# Patient Record
Sex: MALE | HISPANIC OR LATINO | Employment: OTHER | URBAN - NONMETROPOLITAN AREA
[De-identification: names, ages, dates, MRNs, and addresses within clinical notes are randomized per-mention and may not be internally consistent; named-entity substitution may affect disease eponyms.]

---

## 2018-07-03 ENCOUNTER — TRANSCRIBE ORDERS (OUTPATIENT)
Dept: PHYSICAL THERAPY | Facility: HOSPITAL | Age: 33
End: 2018-07-03

## 2018-07-03 DIAGNOSIS — M25.561 RIGHT KNEE PAIN, UNSPECIFIED CHRONICITY: Primary | ICD-10-CM

## 2018-07-13 ENCOUNTER — HOSPITAL ENCOUNTER (OUTPATIENT)
Dept: PHYSICAL THERAPY | Facility: HOSPITAL | Age: 33
Setting detail: THERAPIES SERIES
Discharge: HOME | End: 2018-07-13

## 2018-07-13 DIAGNOSIS — M25.561 RIGHT KNEE PAIN, UNSPECIFIED CHRONICITY: Primary | ICD-10-CM

## 2018-07-13 PROCEDURE — 97110 THERAPEUTIC EXERCISES: CPT | Performed by: PHYSICAL THERAPIST

## 2018-07-13 PROCEDURE — 97161 PT EVAL LOW COMPLEX 20 MIN: CPT | Performed by: PHYSICAL THERAPIST

## 2018-07-13 NOTE — THERAPY EVALUATION
"    Outpatient Physical Therapy Ortho Initial Evaluation  [facility]  Lauriat Castellanos, PT, DPT, CSCS       Patient Name: Arley Colbert Jr.  : 1985  MRN: [id number]  Today's Date: 2018      Visit Date: 2018    Pt reports 4/10 pain pre treatment, \"numb\"/10 pain post treatment  Reports N/A% of improvement.  Attended  visits.  Insurance available: 12 visits  Next MD appt: 4 months .  Recertification: 2018.     History reviewed. No pertinent past medical history.     Past Surgical History:   Procedure Laterality Date   • CYSTOSCOPY W/ LASER LITHOTRIPSY     • VASECTOMY         Visit Dx:     ICD-10-CM ICD-9-CM   1. Right knee pain, unspecified chronicity M25.561 719.46     Number of days off work: N/A    Patient is     Patient has 2 children ages 11 and 13 years old.    Medications: Salsalate 750mg, Buspirone HCL 10mg, Sertraline 100mg, Melatonin 3mg    Allergies: None          Patient History     Row Name 18 0800             History    Chief Complaint Pain  -AJ      Type of Pain Knee pain  -      Date Current Problem(s) Began --   Years, Chronic  -AJ      Brief Description of Current Complaint Patient reports years of R knee pain, never getting better, but not worsening.  He reports dring basic training he hit his knee on the floor of a board. He reports he was never really treated for it.   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 [facility]  -      What clinical tests have you had for this problem? X-ray  -      Results of Clinical Tests [facility]agt[facility]  -   "    History of Previous Related Injuries None since intital injury.  -AJ         Pain     Pain Location Knee  -AJ      Pain at Present 4  -AJ      Pain at Best 1  -AJ      Pain at Worst 7  -AJ      Pain Frequency Constant/continuous  -AJ      Pain Description Throbbing;Patient unable to describe  -AJ      What Performance Factors Make the Current Problem(s) WORSE? None  -AJ      What Performance Factors Make the Current Problem(s) BETTER? None  -AJ      Is your sleep disturbed? No  -AJ      Is medication used to assist with sleep? No  -AJ      Difficulties at work? squatting  -AJ      Difficulties with ADL's? None  -AJ      Difficulties with recreational activities? None  -AJ        User Key  (r) = Recorded By, (t) = Taken By, (c) = Cosigned By    Initials Name Provider Type    MARISELA Castellanos PT Physical Therapist                PT Ortho     Row Name 07/13/18 0900       Subjective Comments    Subjective Comments Patent wishes to get rid of the physical therapy.  -AJ       Precautions and Contraindications    Precautions/Limitations no known precautions/limitations  -AJ       Subjective Pain    Able to rate subjective pain? yes  -AJ    Pre-Treatment Pain Level 4  -AJ       Posture/Observations    Posture- WNL Posture is WNL   for B LEs  -AJ    Posture/Observations Comments No acute distress, wearing B flip flop shoe wear.  -AJ       Knee Special Tests    Anterior drawer (ACL lesion) Bilateral:;Negative  -AJ    Lachman’s (ACL lesion) Bilateral:;Negative  -AJ    Posterior drawer (PCL lesion) Bilateral:;Negative  -AJ    Posterior sag sign (PCL lesion) Bilateral:;Negative  -AJ    Valgus stress (MCL lesion) Bilateral:;Negative  -AJ    Varus stress (LCL lesion) Bilateral:;Negative  -AJ    Pivot shift test (ACL lesion) Bilateral:;Negative  -AJ    Apley’s distraction test (meniscal lesion vs OA) Right:;Positive;Left:;Negative  -AJ    Apley’s compression test (meniscal lesion vs OA) Bilateral:;Negative  -AJ     Gregorio’s test (meniscal lesion) Bilateral:;Negative  -AJ    Reverse pivot shift test (posterolateral instability) Bilateral:;Negative  -AJ    Patellar grind test (chondromalacia patella) Bilateral:;Negative  -AJ    Patellar ballotment test (joint effusion) Bilateral:;Negative  -AJ    Patellofemoral apprehension sign (instability) Bilateral:;Negative  -AJ    Tay compression test (distal ITB syndrome) Bilateral:;Negative  -AJ    Carlee’s sign (DVT) Bilateral:;Negative  -AJ       Right Lower Ext    Rt Knee Extension/Flexion AROM 0°-140°  -AJ       Left Lower Ext    Lt Knee Extension/Flexion AROM 0°-135°  -AJ       MMT (Manual Muscle Testing)    Additional Documentation --  -AJ       General Assessment (Manual Muscle Testing)    Comment, General Manual Muscle Testing (MMT) Assessment B LE 5/5, no change in pain.  -AJ       Sensation    Sensation WNL? WNL  -AJ    Light Touch No apparent deficits  -AJ    Additional Comments Patient denies any numbness or tingling.  -AJ       Lower Extremity Flexibility    Hamstrings Bilateral:;Mildly limited   HS angles R 32°, L 30°  -AJ       Girth    Girth Measured? --   B circum measurements 38.0cm @ joint line.  -AJ       Transfers    Comment (Transfers) I with all transfers, unweights R LE some wqith sit to/from stand  -AJ       Gait/Stairs Assessment/Training    Comment (Gait/Stairs) FWB, non-antalgic gait  -AJ      User Key  (r) = Recorded By, (t) = Taken By, (c) = Cosigned By    Initials Name Provider Type    AJ Laurita Castellanos, PT Physical Therapist          Therapy Education  Given: HEP, Symptoms/condition management (POC)  Program: New  How Provided: Verbal, Demonstration, Written  Provided to: Patient  Level of Understanding: Verbalized, Demonstrated           PT OP Goals     Row Name 07/13/18 0900          PT Short Term Goals    STG Date to Achieve 08/03/18  -AJ     STG 1 I with HEP and have additions/changes by next recertification.  -AJ     STG 2 Patient able to  ambulate up/down 3 steps reciprocally x10, with no increase in pain.  -     STG 3 Patient to have improved HS flexibility to HS angles <= 20° B.  -     STG 4 Patient able to ambulate 1/2 mile with no increase in pain.  -     STG 5 Patient to wear proper shoewear for follow-up PT visits.  -        Long Term Goals    LTG Date to Achieve 08/10/18  -     LTG 1 Patient able to make a full squat and return to standing x10 with no increase in pain.  -     LTG 2 Patient anca to perform a SL squat to ~75-80° with no increase in pain.  -     LTG 3 I with final HEP.  -     LTG 4 D/C with a final HEP nd free 30 day fitness formula membership.  -        Time Calculation    PT Goal Re-Cert Due Date 08/03/18  -       User Key  (r) = Recorded By, (t) = Taken By, (c) = Cosigned By    Initials Name Provider Type    MARISELA Castellanos, PT Physical Therapist         Barriers to Rehab: None noted.    Safety Issues: None noted.            PT Assessment/Plan     Row Name 07/13/18 0900          PT Assessment    Functional Limitations Limitation in home management;Limitations in community activities;Performance in leisure activities;Performance in work activities  -     Impairments Balance;Endurance;Impaired flexibility;Impaired muscle endurance;Impaired muscle length;Impaired muscle power;Poor body mechanics;Pain  -     Assessment Comments Patient did well with all ther ex and given written copy of HEP exercises.  -     Rehab Potential Fair  -     Patient/caregiver participated in establishment of treatment plan and goals Yes  -     Patient would benefit from skilled therapy intervention Yes  -        PT Plan    PT Frequency 2x/week  -     Predicted Duration of Therapy Intervention (Therapy Eval) 3-4 weeks, 6-8 visits  -     Planned CPT's? PT EVAL LOW COMPLEXITY: 12089;PT RE-EVAL: 12899;PT THER PROC EA 15 MIN: 34129;PT THER ACT EA 15 MIN: 19755;PT MANUAL THERAPY EA 15 MIN: 96097;PT ELECTRICAL STIM  "UNATTEND: ;PT THER SUPP EA 15 MIN  -AJ     Physical Therapy Interventions (Optional Details) balance training;gross motor skills;home exercise program;manual therapy techniques;modalities;strengthening;stretching   work specific activities.  -AJ     PT Plan Comments Progress overall VMO strength and functional activities.  -AJ       User Key  (r) = Recorded By, (t) = Taken By, (c) = Cosigned By    Initials Name Provider Type    MARISELA Castellanos PT Physical Therapist       Other therapeutic activities and/or exercises will be prescribed depending on the patients progress or lack there of.          Modalities     Row Name 07/13/18 0900             Ice    Ice Applied Yes  -AJ      Location R knee with elevation  -AJ      Rx Minutes 15 mins  -AJ      Ice S/P Rx Yes  -AJ        User Key  (r) = Recorded By, (t) = Taken By, (c) = Cosigned By    Initials Name Provider Type    MARISELA Castellanos PT Physical Therapist              Exercises     Row Name 07/13/18 0900             Subjective Comments    Subjective Comments Patent wishes to get rid of the physical therapy.  -AJ         Subjective Pain    Able to rate subjective pain? yes  -AJ      Pre-Treatment Pain Level 4  -AJ         Exercise 1    Exercise Name 1 Pro II LEs  -AJ      Time 1 10 minutes  -AJ      Additional Comments L 4.0  -AJ         Exercise 2    Exercise Name 2 B St. HS S  -AJ      Reps 2 2  -AJ      Time 2 30 seconds  -AJ         Exercise 3    Exercise Name 3 Sit to/from stand with add  -AJ      Sets 3 2  -AJ      Reps 3 10  -AJ         Exercise 4    Exercise Name 4 HS curl with add  -AJ      Reps 4 20  -AJ         Exercise 5    Exercise Name 5 SLR with ER  -AJ      Sets 5 2  -AJ      Reps 5 10  -AJ      Time 5 5\" hold  -AJ        User Key  (r) = Recorded By, (t) = Taken By, (c) = Cosigned By    Initials Name Provider Type    MARISELA Castellanos, VALERIE Physical Therapist                        Outcome Measure Options: Lower Extremity " Functional Scale (LEFS)  Lower Extremity Functional Index  Any of your usual work, housework or school activities: Moderate difficulty  Your usual hobbies, recreational or sporting activities: Quite a bit of difficulty  Getting into or out of the bath: No difficulty  Walking between rooms: A little bit of difficulty  Putting on your shoes or socks: A little bit of difficulty  Squatting: Quite a bit of difficulty  Lifting an object, like a bag of groceries from the floor: Moderate difficulty  Performing light activities around your home: A little bit of difficulty  Performing heavy activities around your home: Moderate difficulty  Getting into or out of a car: A little bit of difficulty  Walking 2 blocks: Quite a bit of difficulty  Walking a mile: Extreme difficulty or unable to perform activity  Going up or down 10 stairs (about 1 flight of stairs): Quite a bit of difficulty  Standing for 1 hour: Quite a bit of difficulty  Sitting for 1 hour: Quite a bit of difficulty  Running on even ground: Extreme difficulty or unable to perform activity  Running on uneven ground: Extreme difficulty or unable to perform activity  Making sharp turns while running fast: Extreme difficulty or unable to perform activity  Hopping: Extreme difficulty or unable to perform activity  Rolling over in bed: A little bit of difficulty  Total: 31      Time Calculation:   Start Time: 0853  Stop Time: 0954  Time Calculation (min): 61 min  PT Non-Billable Time (min): 15 min  Total Timed Code Minutes- PT: 23 minute(s)     Therapy Charges for Today     Code Description Service Date Service Provider Modifiers Qty    72550084805 HC PT EVAL LOW COMPLEXITY 2 7/13/2018 Laurita Castellanos, PT GP 1    21577289222 HC PT THER PROC EA 15 MIN 7/13/2018 Laurita Castellanos PT GP 2    49044648595 HC PT THER SUPP EA 15 MIN 7/13/2018 Laurita Castellanos PT GP 1          PT G-Codes  Outcome Measure Options: Lower Extremity Functional Scale (LEFS)          Laurita Castellanos, PT, DPT, CSCS  7/13/2018

## 2018-07-16 ENCOUNTER — HOSPITAL ENCOUNTER (OUTPATIENT)
Dept: PHYSICAL THERAPY | Facility: HOSPITAL | Age: 33
Setting detail: THERAPIES SERIES
Discharge: HOME | End: 2018-07-16

## 2018-07-16 DIAGNOSIS — M25.561 RIGHT KNEE PAIN, UNSPECIFIED CHRONICITY: Primary | ICD-10-CM

## 2018-07-16 PROCEDURE — 97110 THERAPEUTIC EXERCISES: CPT | Performed by: PHYSICAL THERAPIST

## 2018-07-16 NOTE — THERAPY TREATMENT NOTE
Outpatient Physical Therapy Ortho Treatment Note  [facility]  Laurita Castellanos, PT, DPT, CSCS       Patient Name: Arley Colbert Jr.  : 1985  MRN: [id number]  Today's Date: 2018      Visit Date: 2018     Pt reports 0/10 pain pre treatment, 4/10 pain post treatment  Reports 0% of improvement.  Attended 2/2 visits.  Insurance available: 12 visits  Next MD appt: 4 months 2018.  Recertification: 2018.    Visit Dx:    ICD-10-CM ICD-9-CM   1. Right knee pain, unspecified chronicity M25.561 719.46        No past medical history on file.     Past Surgical History:   Procedure Laterality Date   • CYSTOSCOPY W/ LASER LITHOTRIPSY     • VASECTOMY               PT Ortho     Row Name 18 0800       Subjective Comments    Subjective Comments Patient reports that yeaterday his knee was hurting. He reports he did a lot of driving in the car to/from [place]. Today he reports no pain.  -AJ       Precautions and Contraindications    Precautions/Limitations no known precautions/limitations  -       Subjective Pain    Able to rate subjective pain? yes  -AJ    Pre-Treatment Pain Level 0  -AJ    Post-Treatment Pain Level 4  -AJ       Posture/Observations    Posture/Observations Comments No acute distress, wearing B tennis shoes.  -AJ    Row Name 18 0900       Subjective Comments    Subjective Comments Patent wishes to get rid of the physical therapy.  -AJ       Precautions and Contraindications    Precautions/Limitations no known precautions/limitations  -AJ       Subjective Pain    Able to rate subjective pain? yes  -    Pre-Treatment Pain Level 4  -       Posture/Observations    Posture- WNL Posture is WNL   for B LEs  -    Posture/Observations Comments No acute distress, wearing B flip flop shoe wear.  -AJ       Knee Special Tests    Anterior drawer (ACL lesion) Bilateral:;Negative  -AJ    Lachman’s (ACL lesion) Bilateral:;Negative  -AJ    Posterior drawer (PCL  lesion) Bilateral:;Negative  -AJ    Posterior sag sign (PCL lesion) Bilateral:;Negative  -AJ    Valgus stress (MCL lesion) Bilateral:;Negative  -AJ    Varus stress (LCL lesion) Bilateral:;Negative  -AJ    Pivot shift test (ACL lesion) Bilateral:;Negative  -AJ    Apley’s distraction test (meniscal lesion vs OA) Right:;Positive;Left:;Negative  -AJ    Apley’s compression test (meniscal lesion vs OA) Bilateral:;Negative  -AJ    Gregorio’s test (meniscal lesion) Bilateral:;Negative  -AJ    Reverse pivot shift test (posterolateral instability) Bilateral:;Negative  -AJ    Patellar grind test (chondromalacia patella) Bilateral:;Negative  -AJ    Patellar ballotment test (joint effusion) Bilateral:;Negative  -AJ    Patellofemoral apprehension sign (instability) Bilateral:;Negative  -AJ    Tay compression test (distal ITB syndrome) Bilateral:;Negative  -AJ    Carlee’s sign (DVT) Bilateral:;Negative  -AJ       Right Lower Ext    Rt Knee Extension/Flexion AROM 0°-140°  -AJ       Left Lower Ext    Lt Knee Extension/Flexion AROM 0°-135°  -AJ       MMT (Manual Muscle Testing)    Additional Documentation --  -AJ       General Assessment (Manual Muscle Testing)    Comment, General Manual Muscle Testing (MMT) Assessment B LE 5/5, no change in pain.  -AJ       Sensation    Sensation WNL? WNL  -AJ    Light Touch No apparent deficits  -AJ    Additional Comments Patient denies any numbness or tingling.  -AJ       Lower Extremity Flexibility    Hamstrings Bilateral:;Mildly limited   HS angles R 32°, L 30°  -AJ       Girth    Girth Measured? --   B circum measurements 38.0cm @ joint line.  -AJ       Transfers    Comment (Transfers) I with all transfers, unweights R LE some wqith sit to/from stand  -AJ       Gait/Stairs Assessment/Training    Comment (Gait/Stairs) FWB, non-antalgic gait  -AJ      User Key  (r) = Recorded By, (t) = Taken By, (c) = Cosigned By    Initials Name Provider Type    MARISELA Castellanos, PT Physical Therapist     "                        PT Assessment/Plan     Row Name 07/16/18 0800          PT Assessment    Assessment Comments Patient did well with new ther ex and was challended with endurance activities.  -AJ        PT Plan    PT Frequency 2x/week  -AJ     PT Plan Comments Progress higher level activities. Add sport cord step ups and sport cord arcs.  -AJ       User Key  (r) = Recorded By, (t) = Taken By, (c) = Cosigned By    Initials Name Provider Type    MARISELA Castellanos, PT Physical Therapist                Modalities     Row Name 07/16/18 0800             Ice    Ice Applied Yes   to go.  -AJ      Patient denies application of Ice --  -AJ      Patient reports will apply ice at home to involved area Yes  -AJ        User Key  (r) = Recorded By, (t) = Taken By, (c) = Cosigned By    Initials Name Provider Type    MARISELA Castellanos, PT Physical Therapist                Exercises     Row Name 07/16/18 0800             Subjective Comments    Subjective Comments Patient reports that yeaterday his knee was hurting. He reports he did a lot of driving in the car to/from [place]. Today he reports no pain.  -AJ         Subjective Pain    Able to rate subjective pain? yes  -AJ      Pre-Treatment Pain Level 0  -AJ      Post-Treatment Pain Level 4  -AJ         Exercise 1    Exercise Name 1 Pro II LEs  -AJ      Time 1 10 minutes  -AJ      Additional Comments L 4.0  -AJ         Exercise 2    Exercise Name 2 B St. HS S  -AJ      Reps 2 2  -AJ      Time 2 30 seconds  -AJ         Exercise 3    Exercise Name 3 B inclince calf S  -AJ      Reps 3 2  -AJ      Time 3 30 seconds  -AJ         Exercise 4    Exercise Name 4 Sit to/from Stand with add  -AJ      Sets 4 2  -AJ      Reps 4 10  -AJ         Exercise 5    Exercise Name 5 Step up F/L  -AJ      Reps 5 20 each  -AJ      Additional Comments 6\" step  -AJ         Exercise 6    Exercise Name 6 Ecc step downs  -AJ      Sets 6 2  -AJ      Reps 6 10  -AJ      Additional Comments 6\" " "step  -         Exercise 7    Exercise Name 7 Shuttle: 2L Press with add  -      Time 7 5 cords  -AJ      Additional Comments 7 cords  -         Exercise 8    Exercise Name 8 Shuttle: 1L Press  -      Time 8 5 minutes  -AJ      Additional Comments 7 cords  -         Exercise 9    Exercise Name 9 Airex beam F/L, both with MS  -AJ      Reps 9 5 laps each  -         Exercise 10    Exercise Name 10 SLR- add  -      Sets 10 2  -AJ      Reps 10 10  -AJ      Time 10 5\" hold  -AJ         Exercise 11    Exercise Name 11 SLR with ER  -AJ      Sets 11 2  -AJ      Reps 11 10  -AJ      Time 11 5\" hold  -AJ        User Key  (r) = Recorded By, (t) = Taken By, (c) = Cosigned By    Initials Name Provider Type    MARISELA Castellanos, PT Physical Therapist                               PT OP Goals     Row Name 07/16/18 0800          PT Short Term Goals    STG Date to Achieve 08/03/18  -     STG 1 I with HEP and have additions/changes by next recertification.  -     STG 1 Progress Ongoing  -     STG 2 Patient able to ambulate up/down 3 steps reciprocally x10, with no increase in pain.  -     STG 2 Progress Ongoing  -     STG 3 Patient to have improved HS flexibility to HS angles <= 20° B.  -     STG 3 Progress Ongoing  -     STG 4 Patient able to ambulate 1/2 mile with no increase in pain.  -     STG 4 Progress Ongoing  -     STG 5 Patient to wear proper shoewear for follow-up PT visits.  -     STG 5 Progress Met;Ongoing  -        Long Term Goals    LTG Date to Achieve 08/10/18  -     LTG 1 Patient able to make a full squat and return to standing x10 with no increase in pain.  -     LTG 2 Patient anca to perform a SL squat to ~75-80° with no increase in pain.  -     LTG 3 I with final HEP.  -     LTG 4 D/C with a final HEP nd free 30 day fitness formula membership.  -        Time Calculation    PT Goal Re-Cert Due Date 08/03/18  -       User Key  (r) = Recorded By, (t) = Taken By, (c) " = Cosigned By    Initials Name Provider Type    MARISELA Castellanos PT Physical Therapist          Therapy Education  Given: HEP  Program: Reinforced  How Provided: Verbal  Provided to: Patient  Level of Understanding: Verbalized, Demonstrated              Time Calculation:   Start Time: 0800  Stop Time: 0855  Time Calculation (min): 55 min  Total Timed Code Minutes- PT: 55 minute(s)    Therapy Charges for Today     Code Description Service Date Service Provider Modifiers Qty    73523895589 HC PT THER PROC EA 15 MIN 7/16/2018 Laurita Castellanos PT GP 4    [id number] HC PT THER SUPP EA 15 MIN 7/16/2018 Laurita Castellanos PT GP 1                    Laurita Castellanos PT, DPT, CSCS  7/16/2018

## 2018-07-18 ENCOUNTER — HOSPITAL ENCOUNTER (OUTPATIENT)
Dept: PHYSICAL THERAPY | Facility: HOSPITAL | Age: 33
Setting detail: THERAPIES SERIES
Discharge: HOME | End: 2018-07-18

## 2018-07-18 DIAGNOSIS — M25.561 RIGHT KNEE PAIN, UNSPECIFIED CHRONICITY: Primary | ICD-10-CM

## 2018-07-18 PROCEDURE — 97110 THERAPEUTIC EXERCISES: CPT

## 2018-07-18 NOTE — THERAPY TREATMENT NOTE
Outpatient Physical Therapy Ortho Treatment Note  [facility]  Yandy Cantu PTA       Patient Name: Arley Colbert Jr.  : 1985  MRN: [id number]  Today's Date: 2018      Visit Date: 2018     Visits: 3/3  Insurance Visits Approved: 12 visits  Recert Due: 2018  MD Appt: 4 months  Pain: pretreatment 0/10; post treatment 5/10  Improvement: pt is subjectively reporting 0% improvement since initial evaluation    Visit Dx:    ICD-10-CM ICD-9-CM   1. Right knee pain, unspecified chronicity M25.561 719.46       There is no problem list on file for this patient.       No past medical history on file.     Past Surgical History:   Procedure Laterality Date   • CYSTOSCOPY W/ LASER LITHOTRIPSY     • VASECTOMY               PT Ortho     Row Name 18 0800       Subjective Comments    Subjective Comments states that he isn't really having any pain right now  -       Subjective Pain    Able to rate subjective pain? yes  -    Pre-Treatment Pain Level 0  -    Row Name 18 0800       Subjective Comments    Subjective Comments Patient reports that yeaterday his knee was hurting. He reports he did a lot of driving in the car to/from [place]. Today he reports no pain.  -       Precautions and Contraindications    Precautions/Limitations no known precautions/limitations  -       Subjective Pain    Able to rate subjective pain? yes  -    Pre-Treatment Pain Level 0  -    Post-Treatment Pain Level 4  -       Posture/Observations    Posture/Observations Comments No acute distress, wearing B tennis shoes.  -      User Key  (r) = Recorded By, (t) = Taken By, (c) = Cosigned By    Initials Name Provider Type     Yadny Cantu PTA Physical Therapy Assistant    MARISELA Castellanos PT Physical Therapist                            PT Assessment/Plan     Row Name 18 0800          PT Assessment    Assessment Comments patient has increased pain with completing the  wall sits, patient completes them at a 90/90 angle. pt is insructed for home to not squat so low for home with this exercise   -        PT Plan    PT Frequency 2x/week  -     PT Plan Comments track walk next visit, CC Walk outs   -       User Key  (r) = Recorded By, (t) = Taken By, (c) = Cosigned By    Initials Name Provider Type     Yanyd Cantu PTA Physical Therapy Assistant                Modalities     Row Name 07/18/18 0800             Subjective Pain    Post-Treatment Pain Level 5  -         Ice    Ice Applied Yes   to go  -      Patient reports will apply ice at home to involved area Yes  -        User Key  (r) = Recorded By, (t) = Taken By, (c) = Cosigned By    Initials Name Provider Type     Yandy Cantu PTA Physical Therapy Assistant                Exercises     Row Name 07/18/18 0800             Subjective Comments    Subjective Comments states that he isn't really having any pain right now  -         Subjective Pain    Able to rate subjective pain? yes  -      Pre-Treatment Pain Level 0  -      Post-Treatment Pain Level 5  -         Exercise 1    Exercise Name 1 Pro II LE's  -      Time 1 10 minutes  -      Additional Comments L 5.0  -MH         Exercise 2    Exercise Name 2 B St. HS S  -      Reps 2 2  -      Time 2 30 sec hold  -         Exercise 3    Exercise Name 3 B Incline Calf S  -      Reps 3 2  -MH      Time 3 30 sec hold  -         Exercise 4    Exercise Name 4 Step Up Fwd  -      Reps 4 20  -MH      Additional Comments 6 inch  -         Exercise 5    Exercise Name 5 Step Up Lat  -      Reps 5 20  -MH      Additional Comments 6 inch  -         Exercise 6    Exercise Name 6 Ecc Step Downs  -MH      Reps 6 20  -MH      Additional Comments 6 inch   -         Exercise 7    Exercise Name 7 Sport Cord Arc  -      Reps 7 3 laps  -      Additional Comments lrg cord  -         Exercise 8    Exercise Name 8 Sport Cord Resisted Step Ups Fwd  -       Reps 8 20  -      Additional Comments lrg cord  -         Exercise 9    Exercise Name 9 Sport Cord Resisted Step Up Lat  -      Reps 9 20  -      Additional Comments lrg cord  -         Exercise 10    Exercise Name 10 Wall Sits  -      Reps 10 10  -      Time 10 20 sec hold  -         Exercise 11    Exercise Name 11 Shuttle 2L   -      Time 11 5 minutes  -      Additional Comments 7 cords  -         Exercise 12    Exercise Name 12 Shuttle 1L  -      Time 12 5 minutes  -      Additional Comments 7 cords  -        User Key  (r) = Recorded By, (t) = Taken By, (c) = Cosigned By    Initials Name Provider Type     Yandy Cantu, PTA Physical Therapy Assistant                               PT OP Goals     Row Name 07/18/18 0800          PT Short Term Goals    STG Date to Achieve 08/03/18  -     STG 1 I with HEP and have additions/changes by next recertification.  -     STG 1 Progress Ongoing  [facility]     STG 2 Patient able to ambulate up/down 3 steps reciprocally x10, with no increase in pain.  -     STG 2 Progress Ongoing  [facility]     STG 3 Patient to have improved HS flexibility to HS angles <= 20° B.  -     STG 3 Progress Ongoing  [facility]     STG 4 Patient able to ambulate 1/2 mile with no increase in pain.  -     STG 4 Progress Ongoing  [facility]     STG 5 Patient to wear proper shoewear for follow-up PT visits.  -     STG 5 Progress Met;Ongoing  [facility]        Long Term Goals    LTG Date to Achieve 08/10/18  -     LTG 1 Patient able to make a full squat and return to standing x10 with no increase in pain.  -     LTG 1 Progress Ongoing  [facility]     LTG 2 Patient anca to perform a SL squat to ~75-80° with no increase in pain.  -     LTG 2 Progress Ongoing  [facility]     LTG 3 I with final HEP.  -     LTG 3 Progress Ongoing  [facility]     LTG 4 D/C with a final HEP nd free 30 day fitness formula membership.  -     LTG 4 Progress Ongoing  [facility]        Time Calculation    PT Goal Re-Cert Due Date 08/03/18   -       User Key  (r) = Recorded By, (t) = Taken By, (c) = Cosigned By    Initials Name Provider Type     Yandy Cantu PTA Physical Therapy Assistant          Therapy Education  Education Details: wall sits  Given: HEP, Symptoms/condition management, Pain management  Program: New  How Provided: Verbal, Demonstration, Written  Provided to: Patient  Level of Understanding: Verbalized, Demonstrated, Teach back education performed              Time Calculation:   Start Time: 0838  Stop Time: 0932  Time Calculation (min): 54 min  Total Timed Code Minutes- PT: 54 minute(s)    Therapy Charges for Today     Code Description Service Date Service Provider Modifiers Qty    44817912384 HC PT THER PROC EA 15 MIN 7/18/2018 Yandy Cantu PTA GP 4    20090238064 HC PT THER SUPP EA 15 MIN 7/18/2018 Yandy Cantu PTA GP 1                    Yandy Cantu PTA  7/18/2018

## 2018-07-23 ENCOUNTER — APPOINTMENT (OUTPATIENT)
Dept: PHYSICAL THERAPY | Facility: HOSPITAL | Age: 33
End: 2018-07-23

## 2018-07-24 ENCOUNTER — HOSPITAL ENCOUNTER (OUTPATIENT)
Dept: PHYSICAL THERAPY | Facility: HOSPITAL | Age: 33
Setting detail: THERAPIES SERIES
Discharge: HOME | End: 2018-07-24

## 2018-07-24 DIAGNOSIS — M25.561 RIGHT KNEE PAIN, UNSPECIFIED CHRONICITY: Primary | ICD-10-CM

## 2018-07-24 PROCEDURE — 97110 THERAPEUTIC EXERCISES: CPT

## 2018-07-24 NOTE — THERAPY TREATMENT NOTE
Outpatient Physical Therapy Ortho Treatment Note  [facility]  Eliza Brady ATC       Patient Name: Arley Colbert Jr.  : 1985  MRN: [id number]  Today's Date: 2018      Visit Date: 2018  Pt reports 4/10 pain pre treatment, 3/10 pain post treatment  Reports 0% of improvement.  Attended 4/4 visits.  Insurance available: 12 visits  Next MD appt: 4 months .  Recertification: 2018.  Visit Dx:    ICD-10-CM ICD-9-CM   1. Right knee pain, unspecified chronicity M25.561 719.46       There is no problem list on file for this patient.       No past medical history on file.     Past Surgical History:   Procedure Laterality Date   • CYSTOSCOPY W/ LASER LITHOTRIPSY     • VASECTOMY               PT Ortho     Row Name 18 0900       Subjective Comments    Subjective Comments (P)  states that he is having a little bit of pain his date.   -HB       Subjective Pain    Able to rate subjective pain? (P)  yes  -HB    Pre-Treatment Pain Level (P)  4  -HB       Posture/Observations    Posture/Observations Comments (P)  no acute distress  -HB      User Key  (r) = Recorded By, (t) = Taken By, (c) = Cosigned By    Initials Name Provider Type     Eliza Brady, HARJIT                             PT Assessment/Plan     Row Name 18 0900          PT Assessment    Assessment Comments (P)  tolerated therex well  -HB        PT Plan    PT Frequency (P)  2x/week  -HB     PT Plan Comments (P)  resume sports cord arcs and progress track walk   -HB       User Key  (r) = Recorded By, (t) = Taken By, (c) = Cosigned By    Initials Name Provider Type     Eliza Brady, ATC                 Modalities     Row Name 18 0900             Ice    Ice Applied (P)  Yes   to go   -HB        User Key  (r) = Recorded By, (t) = Taken By, (c) = Cosigned By    Initials Name Provider Type     Eliza Brady, ATC                 Exercises     Row Name 18  "0900             Subjective Comments    Subjective Comments (P)  states that he is having a little bit of pain his date.   -HB         Subjective Pain    Able to rate subjective pain? (P)  yes  -HB      Pre-Treatment Pain Level (P)  4  -HB      Post-Treatment Pain Level (P)  3  -HB         Exercise 1    Exercise Name 1 (P)  Pro II LE's  -HB      Time 1 (P)  10 minutes  -HB      Additional Comments (P)  L5.0  -HB         Exercise 2    Exercise Name 2 (P)  B St. HS S  -HB      Reps 2 (P)  2  -HB      Time 2 (P)  30 sec hold  -HB         Exercise 3    Exercise Name 3 (P)  B Incline Calf S  -HB      Reps 3 (P)  2  -HB      Time 3 (P)  30 sec hold  -HB         Exercise 4    Exercise Name 4 (P)  Step Up Fwd  -HB      Reps 4 (P)  20  -HB      Additional Comments (P)  6\"  -HB         Exercise 5    Exercise Name 5 (P)  Step Up Lat  -HB      Reps 5 (P)  20  -HB      Additional Comments (P)  6\"  -HB         Exercise 6    Exercise Name 6 (P)  Ecc Step Downs  -HB      Reps 6 (P)  20  -HB      Additional Comments (P)  6\"  -HB         Exercise 7    Exercise Name 7 (P)  CC walk outs fwd/back/lateral  -HB      Reps 7 (P)  5  -HB      Additional Comments (P)  4 plates  -HB         Exercise 8    Exercise Name 8 (P)  shuttle 2L   -HB      Time 8 (P)  5 minutes  -HB      Additional Comments (P)  7 cords  -HB         Exercise 9    Exercise Name 9 (P)  Shuttle 1L   -HB      Time 9 (P)  5 minutes  -HB      Additional Comments (P)  7 cords  -HB         Exercise 10    Exercise Name 10 (P)  track walk   -HB      Reps 10 (P)  4 laps  -HB        User Key  (r) = Recorded By, (t) = Taken By, (c) = Cosigned By    Initials Name Provider Type    HB Eliza Brady, ATC                                PT OP Goals     Row Name 07/24/18 0900          PT Short Term Goals    STG Date to Achieve (P)  08/03/18  -HB     STG 1 (P)  I with HEP and have additions/changes by next recertification.  -HB     STG 1 Progress (P)  Ongoing  -HB     STG 2 " (P)  Patient able to ambulate up/down 3 steps reciprocally x10, with no increase in pain.  -HB     STG 2 Progress (P)  Ongoing  -HB     STG 3 (P)  Patient to have improved HS flexibility to HS angles <= 20° B.  -HB     STG 3 Progress (P)  Ongoing  -HB     STG 4 (P)  Patient able to ambulate 1/2 mile with no increase in pain.  -HB     STG 4 Progress (P)  Ongoing  -HB     STG 5 (P)  Patient to wear proper shoewear for follow-up PT visits.  -HB     STG 5 Progress (P)  Met;Ongoing  -HB        Long Term Goals    LTG Date to Achieve (P)  08/10/18  -HB     LTG 1 (P)  Patient able to make a full squat and return to standing x10 with no increase in pain.  -HB     LTG 1 Progress (P)  Ongoing  -HB     LTG 2 (P)  Patient anca to perform a SL squat to ~75-80° with no increase in pain.  -HB     LTG 2 Progress (P)  Ongoing  -HB     LTG 3 (P)  I with final HEP.  -HB     LTG 3 Progress (P)  Ongoing  -HB     LTG 4 (P)  D/C with a final HEP nd free 30 day fitness formula membership.  -HB     LTG 4 Progress (P)  Ongoing  -HB        Time Calculation    PT Goal Re-Cert Due Date (P)  08/03/18  -HB       User Key  (r) = Recorded By, (t) = Taken By, (c) = Cosigned By    Initials Name Provider Type    HB Eliza Brady, ATC                          Time Calculation:   Start Time: (P) 0858  Stop Time: (P) 0940  Time Calculation (min): (P) 42 min  Total Timed Code Minutes- PT: (P) 42 minute(s)    Therapy Charges for Today     Code Description Service Date Service Provider Modifiers Qty    63565487205 HC PT THER PROC EA 15 MIN 7/24/2018 Eliza Brady, ATC  3    03794944635 HC PT THER SUPP EA 15 MIN 7/24/2018 Eliza Brady, ATC  1                    Eliza Brady, ATC  7/24/2018

## 2018-07-26 ENCOUNTER — HOSPITAL ENCOUNTER (OUTPATIENT)
Dept: PHYSICAL THERAPY | Facility: HOSPITAL | Age: 33
Setting detail: THERAPIES SERIES
Discharge: HOME | End: 2018-07-26

## 2018-07-26 DIAGNOSIS — M25.561 RIGHT KNEE PAIN, UNSPECIFIED CHRONICITY: Primary | ICD-10-CM

## 2018-07-26 PROCEDURE — 97110 THERAPEUTIC EXERCISES: CPT

## 2018-07-26 NOTE — THERAPY TREATMENT NOTE
Outpatient Physical Therapy Ortho Treatment Note  [facility]  Eliza Brady ATC       Patient Name: Arley Colbert Jr.  : 1985  MRN: [id number]  Today's Date: 2018      Visit Date: 2018  Pt reports 6/10 pain pre treatment, 0/10 pain post treatment  Reports 0% of improvement.  Attended 5/5 visits.  Insurance available: 12 visits  Next MD appt: .  Recertification: 2018.  Visit Dx:    ICD-10-CM ICD-9-CM   1. Right knee pain, unspecified chronicity M25.561 719.46       There is no problem list on file for this patient.       No past medical history on file.     Past Surgical History:   Procedure Laterality Date   • CYSTOSCOPY W/ LASER LITHOTRIPSY     • VASECTOMY               PT Ortho     Row Name 18 08       Subjective Comments    Subjective Comments (P)  states hat he woke up very sore this date and his pain is evelvated   -HB       Subjective Pain    Able to rate subjective pain? (P)  yes  -HB    Pre-Treatment Pain Level (P)  6  -HB       Posture/Observations    Posture/Observations Comments (P)  no acute distress  -HB    Row Name 18 0900       Subjective Comments    Subjective Comments (P)  states that he is having a little bit of pain his date.   -HB       Subjective Pain    Able to rate subjective pain? (P)  yes  -HB    Pre-Treatment Pain Level (P)  4  -HB       Posture/Observations    Posture/Observations Comments (P)  no acute distress  -HB      User Key  (r) = Recorded By, (t) = Taken By, (c) = Cosigned By    Initials Name Provider Type     Eliza Brady ATC                             PT Assessment/Plan     Row Name 18 08          PT Assessment    Assessment Comments (P)  tolerated therex but pain did flare up towards the end but was able to complete therex. iced down patient in order to reduce pain   -HB        PT Plan    PT Frequency (P)  2x/week  -HB     PT Plan Comments (P)  [place]  -HB       User Key  (r) =  Recorded By, (t) = Taken By, (c) = Cosigned By    Initials Name Provider Type    HB Eliza Brady, ATC                 Modalities     Row Name 07/26/18 0800             Ice    Ice Applied (P)  Yes  -HB      Rx Minutes (P)  15 mins  -HB      Ice S/P Rx (P)  Yes  -HB        User Key  (r) = Recorded By, (t) = Taken By, (c) = Cosigned By    Initials Name Provider Type    MARIZOL Brady, ATC                 Exercises     Row Name 07/26/18 0800             Subjective Comments    Subjective Comments (P)  states hat he woke up very sore this date and his pain is evelvated   -HB         Subjective Pain    Able to rate subjective pain? (P)  yes  -HB      Pre-Treatment Pain Level (P)  6  -HB         Exercise 1    Exercise Name 1 (P)  Pro II LE's  -HB      Time 1 (P)  13 minutes  -HB      Additional Comments (P)  L 5.0  -HB         Exercise 2    Exercise Name 2 (P)  Shuttle 2 L PRESS   -HB      Time 2 (P)  5 minutes  -HB      Additional Comments (P)  7 cords  -HB         Exercise 3    Exercise Name 3 (P)  shuttle 1l press   -HB      Time 3 (P)  5 minutes  -HB      Additional Comments (P)  7 cords  -HB         Exercise 4    Exercise Name 4 (P)  CC walkouts 4 wy   -HB      Reps 4 (P)  5  -HB      Additional Comments (P)  4 plates  -HB         Exercise 5    Exercise Name 5 (P)  ham S   -HB      Reps 5 (P)  2  -HB      Time 5 (P)  30 sec  -HB         Exercise 6    Exercise Name 6 (P)  B incline S  -HB      Reps 6 (P)  2  -HB      Time 6 (P)  30 sec  -HB         Exercise 7    Exercise Name 7 (P)  Step up lateral/FWD  -HB      Reps 7 (P)  20  -HB      Additional Comments (P)  6 inch   -HB         Exercise 8    Exercise Name 8 (P)  sports cord arcs with MS  -HB      Reps 8 (P)  5  -HB        User Key  (r) = Recorded By, (t) = Taken By, (c) = Cosigned By    Initials Name Provider Type    MARIZOL Brady, ATC                                PT OP Goals     Row Name 07/26/18 0800           Time Calculation    PT Goal Re-Cert Due Date (P)  08/03/18  -       User Key  (r) = Recorded By, (t) = Taken By, (c) = Cosigned By    Initials Name Provider Type     Eliza Brady, ATC           Therapy Education  Given: (P) HEP, Symptoms/condition management, Pain management  Program: (P) Reinforced  How Provided: (P) Verbal, Demonstration  Provided to: (P) Patient  Level of Understanding: (P) Verbalized, Demonstrated              Time Calculation:   Start Time: (P) 0800  Stop Time: (P) 0900  Time Calculation (min): (P) 60 min  PT Non-Billable Time (min): (P) 15 min  Total Timed Code Minutes- PT: (P) 45 minute(s)    Therapy Charges for Today     Code Description Service Date Service Provider Modifiers Qty    70400482352  PT THER PROC EA 15 MIN 7/26/2018 Eliza Bardy, ATC  3    09266216966 HC PT THER SUPP EA 15 MIN 7/26/2018 Eliza Brady, ATC  1                    Eliza Brady, ATC  7/26/2018

## 2018-08-01 ENCOUNTER — HOSPITAL ENCOUNTER (OUTPATIENT)
Dept: PHYSICAL THERAPY | Facility: HOSPITAL | Age: 33
Setting detail: THERAPIES SERIES
Discharge: HOME | End: 2018-08-01

## 2018-08-01 DIAGNOSIS — M25.561 RIGHT KNEE PAIN, UNSPECIFIED CHRONICITY: Primary | ICD-10-CM

## 2018-08-01 PROCEDURE — 97110 THERAPEUTIC EXERCISES: CPT

## 2018-08-01 NOTE — THERAPY TREATMENT NOTE
Outpatient Physical Therapy Ortho Treatment Note  [facility]  Yandy Cantu PTA       Patient Name: Arley Colbert Jr.  : 1985  MRN: [id number]  Today's Date: 2018      Visit Date: 2018     Visits: 6/6  Insurance Visits Approved: 12 visits  Recert Due: 2018  MD Appt: 4 months  Pain: pretreatment 6/10; post treatment 6/10  Improvement: pt is subjectively reporting 0% improvement since initial evaluation    Visit Dx:    ICD-10-CM ICD-9-CM   1. Right knee pain, unspecified chronicity M25.561 719.46       There is no problem list on file for this patient.       No past medical history on file.     Past Surgical History:   Procedure Laterality Date   • CYSTOSCOPY W/ LASER LITHOTRIPSY     • VASECTOMY               PT Ortho     Row Name 18 08       Subjective Comments    Subjective Comments sent a message to the doctor. doesn't feel like knee is getting any better. doctor hasn't responded yet.   -       Subjective Pain    Able to rate subjective pain? yes  -    Pre-Treatment Pain Level 6  -    Post-Treatment Pain Level 6  -       Posture/Observations    Posture/Observations Comments pt arrives late for appointment today.   -      User Key  (r) = Recorded By, (t) = Taken By, (c) = Cosigned By    Initials Name Provider Type     Yandy Cantu PTA Physical Therapy Assistant                            PT Assessment/Plan     Row Name 18 08          PT Assessment    Assessment Comments complaints of increased pain with squatting activity. no increase with reciprocal stairs.   -        PT Plan    PT Frequency 2x/week  -     PT Plan Comments recheck next visit.   -       User Key  (r) = Recorded By, (t) = Taken By, (c) = Cosigned By    Initials Name Provider Type    RODOLFO Cantu PTA Physical Therapy Assistant                    Exercises     Row Name 18 0800             Subjective Comments    Subjective Comments sent a message to the  doctor. doesn't feel like knee is getting any better. doctor hasn't responded yet.   -         Subjective Pain    Able to rate subjective pain? yes  -      Pre-Treatment Pain Level 6  -      Post-Treatment Pain Level 6  -         Exercise 1    Exercise Name 1 Pro II LE's  -      Time 1 10 minutes  -      Additional Comments L 5.0  -         Exercise 2    Exercise Name 2 B St. HS S  -      Reps 2 2  -      Time 2 30 sec hold  -         Exercise 3    Exercise Name 3 Incline Calf S  -      Reps 3 2  -      Time 3 30 sec hold  -         Exercise 4    Exercise Name 4 Sport Cord Step Up Fwd  -      Reps 4 20  -MH      Additional Comments Lrg Cord  -         Exercise 5    Exercise Name 5 Sport Cord Step Up Lat  -      Reps 5 20  -      Additional Comments Lrg Cord  -         Exercise 6    Exercise Name 6 Sport Cord Archs with MS  -      Reps 6 4 laps  -      Additional Comments Lrg Cord; increased pain with MS  -         Exercise 7    Exercise Name 7 reciprocal stairs  -      Reps 7 10  -      Additional Comments no increase in pain  -         Exercise 8    Exercise Name 8 CC Resisted Walk out Lat  -      Reps 8 10 B  -      Additional Comments 4 plates  -         Exercise 9    Exercise Name 9 B Single Leg 1/4 squats  -      Sets 9 2  -      Reps 9 10  -         Exercise 10    Exercise Name 10 Track Walk   -      Reps 10 8 laps  -        User Key  (r) = Recorded By, (t) = Taken By, (c) = Cosigned By    Initials Name Provider Type     Yandy Cantu, PTA Physical Therapy Assistant                               PT OP Goals     Row Name 08/01/18 0800          PT Short Term Goals    STG Date to Achieve 08/03/18  -     STG 1 I with HEP and have additions/changes by next recertification.  -     STG 1 Progress Partially Met;Ongoing  -     STG 2 Patient able to ambulate up/down 3 steps reciprocally x10, with no increase in pain.  -     STG 2 Progress Met  -      STG 3 Patient to have improved HS flexibility to HS angles <= 20° B.  -     STG 3 Progress Ongoing  -     STG 4 Patient able to ambulate 1/2 mile with no increase in pain.  -     STG 4 Progress Met  -     STG 5 Patient to wear proper shoewear for follow-up PT visits.  -     STG 5 Progress Met  -        Long Term Goals    LTG Date to Achieve 08/10/18  -     LTG 1 Patient able to make a full squat and return to standing x10 with no increase in pain.  -     LTG 1 Progress Ongoing  -     LTG 2 Patient anca to perform a SL squat to ~75-80° with no increase in pain.  -     LTG 2 Progress Ongoing  -     LTG 3 I with final HEP.  -     LTG 3 Progress Ongoing  [facility]     LTG 4 D/C with a final HEP nd free 30 day fitness formula membership.  -     LTG 4 Progress Ongoing  -        Time Calculation    PT Goal Re-Cert Due Date 08/03/18  -       User Key  (r) = Recorded By, (t) = Taken By, (c) = Cosigned By    Initials Name Provider Type     Yandy Cantu PTA Physical Therapy Assistant          Therapy Education  Given: HEP, Symptoms/condition management, Pain management  Program: Reinforced  How Provided: Verbal, Demonstration  Provided to: Patient  Level of Understanding: Verbalized, Demonstrated              Time Calculation:   Start Time: 0809  Stop Time: 0918  Time Calculation (min): 69 min  PT Non-Billable Time (min): 15 min  Total Timed Code Minutes- PT: 54 minute(s)    Therapy Charges for Today     Code Description Service Date Service Provider Modifiers Qty    03690032522 HC PT THER PROC EA 15 MIN 8/1/2018 Yandy Cantu PTA GP 4    64733857034 HC PT THER SUPP EA 15 MIN 8/1/2018 Yandy Cantu PTA GP 1                    Yandy Cantu PTA  8/1/2018

## 2018-08-03 ENCOUNTER — HOSPITAL ENCOUNTER (OUTPATIENT)
Dept: PHYSICAL THERAPY | Facility: HOSPITAL | Age: 33
Setting detail: THERAPIES SERIES
Discharge: HOME | End: 2018-08-03

## 2018-08-03 DIAGNOSIS — M25.561 RIGHT KNEE PAIN, UNSPECIFIED CHRONICITY: Primary | ICD-10-CM

## 2018-08-03 PROCEDURE — 97110 THERAPEUTIC EXERCISES: CPT | Performed by: PHYSICAL THERAPIST

## 2018-08-03 NOTE — THERAPY DISCHARGE NOTE
Outpatient Physical Therapy Ortho Progress Note/Discharge Summary  [facility]  Laurita Castellanos, PT, DPT, CSCS       Patient Name: Arley Colbert Jr.  : 1985  MRN: [id number]  Today's Date: 8/3/2018      Visit Date: 2018     Pt reports 4/10 pain pre treatment, 3/10 pain post treatment  Reports 0% of improvement.  Attended 7/7 visits.  Insurance available: 12 visits  Next MD appt: TBMELISA .  Recertification: N/A    Visit Dx:    ICD-10-CM ICD-9-CM   1. Right knee pain, unspecified chronicity M25.561 719.46        History reviewed. No pertinent past medical history.     Past Surgical History:   Procedure Laterality Date   • CYSTOSCOPY W/ LASER LITHOTRIPSY     • VASECTOMY       Number of days off work: N/A    Changes to medications: None noted.    Changes to MD orders: None noted.          PT Ortho     Row Name 18 0800       Subjective Comments    Subjective Comments Patient reports the knee is no better.  -AJ       Subjective Pain    Able to rate subjective pain? yes  -AJ    Pre-Treatment Pain Level 4  -AJ    Post-Treatment Pain Level 3  -AJ       Posture/Observations    Posture/Observations Comments Patient arrives 5 min late, no acute distress, wearing B tennis shoes.  -AJ       Knee Special Tests    Anterior drawer (ACL lesion) Bilateral:;Negative  -AJ    Lachman’s (ACL lesion) Bilateral:;Negative  -AJ    Posterior drawer (PCL lesion) Bilateral:;Negative  -AJ    Posterior sag sign (PCL lesion) Bilateral:;Negative  -AJ    Valgus stress (MCL lesion) Bilateral:;Negative  -AJ    Varus stress (LCL lesion) Bilateral:;Negative  -AJ    Pivot shift test (ACL lesion) Bilateral:;Negative  -AJ    Apley’s distraction test (meniscal lesion vs OA) Bilateral:;Negative  -AJ    Apley’s compression test (meniscal lesion vs OA) Bilateral:;Negative  -AJ    Gregorio’s test (meniscal lesion) Bilateral:;Negative  -AJ    Reverse pivot shift test (posterolateral instability) Bilateral:;Negative   -AJ    Patellar grind test (chondromalacia patella) Bilateral:;Negative  -AJ    Patellar ballotment test (joint effusion) Bilateral:;Negative  -AJ    Patellofemoral apprehension sign (instability) Bilateral:;Negative  -AJ    Tay compression test (distal ITB syndrome) Bilateral:;Negative  -AJ    Carlee’s sign (DVT) Bilateral:;Negative  -AJ       Right Lower Ext    Rt Knee Extension/Flexion AROM 0°-140°  -AJ       Left Lower Ext    Lt Knee Extension/Flexion AROM 0°-140°  -AJ       General Assessment (Manual Muscle Testing)    Comment, General Manual Muscle Testing (MMT) Assessment B LE 5/5, no change in pain.  -AJ       Sensation    Sensation WNL? WNL  -AJ    Light Touch No apparent deficits  -AJ    Additional Comments Patient denies any numbness or tngling.  -AJ       Lower Extremity Flexibility    Hamstrings Bilateral:;Mildly limited   R 20°, L 18°  -AJ       Girth    Girth Measured? --   = at B joint line at 38.1cm  -AJ       Transfers    Comment (Transfers) I with all transfers.  -AJ       Gait/Stairs Assessment/Training    [place] Level (Gait) independent  -AJ    Distance in Feet (Gait) 1/2 mile  -AJ    [place] Level (Stairs) independent  -AJ    Handrail Location (Stairs) none  -AJ    Number of Steps (Stairs) 30  -AJ    Ascending Technique (Stairs) step-over-step  -AJ    Descending Technique (Stairs) step-over-step  -AJ    [place] Level (Ramp) conditional independence  -AJ    Handrail Location (Ramp) none  -AJ    Comment (Gait/Stairs) FWB, non-antalgic gait, no issues with stairs or ramp, no change in pain.  -AJ    Row Name 08/01/18 0800       Subjective Comments    Subjective Comments sent a message to the doctor. doesn't feel like knee is getting any better. doctor hasn't responded yet.   -       Subjective Pain    Able to rate subjective pain? yes  -    Pre-Treatment Pain Level 6  -    Post-Treatment Pain Level 6  -       Posture/Observations    Posture/Observations Comments pt arrives  late for appointment today.   -      User Key  (r) = Recorded By, (t) = Taken By, (c) = Cosigned By    Initials Name Provider Type    Yandy Bellamy PTA Physical Therapy Assistant    Laurita Chow, PT Physical Therapist           Barriers to Rehab: Include significant or possible arthritic/degenerative changes that have occurred within the joint.    Safety Issues: None noted.          PT Assessment/Plan     Row Name 08/03/18 0800          PT Assessment    Functional Limitations Limitation in home management;Limitations in community activities;Performance in leisure activities;Performance in work activities  -     Impairments Impaired muscle endurance;Pain  -     Assessment Comments Patient is functional but still complains of pain. Recommended to patient ot follow back up with referring provider for possible further testing.  -     Rehab Potential Fair  -     Patient/caregiver participated in establishment of treatment plan and goals Yes  -     Patient would benefit from skilled therapy intervention No  -AJ        PT Plan    PT Frequency --   N/A  -     Predicted Duration of Therapy Intervention (Therapy Eval) N/A  -     PT Plan Comments D/C today with a final HEP and free 30 day fitness formula membership.  -       User Key  (r) = Recorded By, (t) = Taken By, (c) = Cosigned By    Initials Name Provider Type    Laurita Chow, PT Physical Therapist                Modalities     Row Name 08/03/18 0800             Ice    Ice Applied Yes  -      Location R knee with elevation  -      Rx Minutes 15 mins  -      Ice S/P Rx Yes  -        User Key  (r) = Recorded By, (t) = Taken By, (c) = Cosigned By    Initials Name Provider Type    Laurita Chow, PT Physical Therapist                Exercises     Row Name 08/03/18 0800             Subjective Comments    Subjective Comments Patient reports the knee is no better.  -         Subjective Pain    Able to rate  "subjective pain? yes  -AJ      Pre-Treatment Pain Level 4  -AJ      Post-Treatment Pain Level 3  -AJ         Exercise 1    Exercise Name 1 Pro II LE's  -AJ      Time 1 10 minutes  -AJ      Additional Comments L 5.0  -AJ         Exercise 2    Exercise Name 2 B St. HS S  -AJ      Reps 2 2  -AJ      Time 2 30 sec hold  -AJ         Exercise 3    Exercise Name 3 Incline Calf S  -AJ      Reps 3 2  -AJ      Time 3 30 sec hold  -AJ         Exercise 4    Exercise Name 4 R st. Lunge S  -AJ      Reps 4 10  -AJ      Time 4 10\" hold  -AJ         Exercise 5    Exercise Name 5 3 reciprocal steps  -AJ      Reps 5 10  -AJ         Exercise 6    Exercise Name 6 Gym: track Walk Fwd  -AJ      Reps 6 8 laps  -AJ         Exercise 7    Exercise Name 7 Full squats to floor  -AJ      Reps 7 10  -AJ         Exercise 8    Exercise Name 8 Attempted SL squats  -AJ      Additional Comments Unable to complete due to increase in pain.  -         Exercise 9    Exercise Name 9 Discussion of final HEP and POC.  -        User Key  (r) = Recorded By, (t) = Taken By, (c) = Cosigned By    Initials Name Provider Type    Laurita Chow, PT Physical Therapist                               PT OP Goals     Row Name 08/03/18 0800          PT Short Term Goals    STG Date to Achieve 08/03/18  -     STG 1 I with HEP and have additions/changes by next recertification.  -AJ     STG 1 Progress Met  -     STG 2 Patient able to ambulate up/down 3 steps reciprocally x10, with no increase in pain.  -     STG 2 Progress Met  -     STG 3 Patient to have improved HS flexibility to HS angles <= 20° B.  -     STG 3 Progress Met  -     STG 4 Patient able to ambulate 1/2 mile with no increase in pain.  -     STG 4 Progress Met  -     STG 5 Patient to wear proper shoewear for follow-up PT visits.  -     STG 5 Progress Met  -        Long Term Goals    LTG Date to Achieve 08/10/18  -     LTG 1 Patient able to make a full squat and return to " standing x10 with no increase in pain.  -     LTG 1 Progress Met  -     LTG 2 Patient anca to perform a SL squat to ~75-80° with no increase in pain.  -     LTG 2 Progress Not Met  -     LTG 3 I with final HEP.  -     LTG 3 Progress Met  -     LTG 4 D/C with a final HEP nd free 30 day fitness formula membership.  -     LTG 4 Progress Met  -        Time Calculation    PT Goal Re-Cert Due Date --   N/A  -       User Key  (r) = Recorded By, (t) = Taken By, (c) = Cosigned By    Initials Name Provider Type    Laurita Chow, PT Physical Therapist               Outcome Measure Options: Lower Extremity Functional Scale (LEFS)  Lower Extremity Functional Index  Any of your usual work, housework or school activities: Quite a bit of difficulty  Your usual hobbies, recreational or sporting activities: A little bit of difficulty  Getting into or out of the bath: A little bit of difficulty  Walking between rooms: No difficulty  Putting on your shoes or socks: A little bit of difficulty  Squatting: Quite a bit of difficulty  Lifting an object, like a bag of groceries from the floor: Moderate difficulty  Performing light activities around your home: A little bit of difficulty  Performing heavy activities around your home: Quite a bit of difficulty  Getting into or out of a car: A little bit of difficulty  Walking 2 blocks: Moderate difficulty  Walking a mile: Quite a bit of difficulty  Going up or down 10 stairs (about 1 flight of stairs): A little bit of difficulty  Standing for 1 hour: Quite a bit of difficulty  Sitting for 1 hour: Extreme difficulty or unable to perform activity  Running on even ground: Extreme difficulty or unable to perform activity  Running on uneven ground: Extreme difficulty or unable to perform activity  Making sharp turns while running fast: Extreme difficulty or unable to perform activity  Hopping: Extreme difficulty or unable to perform activity  Rolling over in bed: A  little bit of difficulty  Total: 34      Time Calculation:   Start Time: 0805  Stop Time: 0906  Time Calculation (min): 61 min  PT Non-Billable Time (min): 15 min  Total Timed Code Minutes- PT: 46 minute(s)    Therapy Charges for Today     Code Description Service Date Service Provider Modifiers Qty    10018670954 HC PT THER PROC EA 15 MIN 8/3/2018 Laurita Castellanos, PT GP 3    93594838740 HC PT THER SUPP EA 15 MIN 8/3/2018 Laurita Castellanos, PT GP 1          PT G-Codes  Outcome Measure Options: Lower Extremity Functional Scale (LEFS)     OP PT Discharge Summary  Date of Discharge: 08/03/18  Reason for Discharge: Independent, Patient/Caregiver request  Outcomes Achieved: Patient able to partially acheive established goals, Refer to plan of care for updates on goals achieved  Discharge Destination: Home with home program  Discharge Instructions/Additional Comments: D/C with a final HEP and free 30 day fitness formula memebership.      Laurita Castellanos, PT, DPT, CSCS  8/3/2018

## 2018-08-07 ENCOUNTER — APPOINTMENT (OUTPATIENT)
Dept: PHYSICAL THERAPY | Facility: HOSPITAL | Age: 33
End: 2018-08-07

## 2018-08-10 ENCOUNTER — APPOINTMENT (OUTPATIENT)
Dept: PHYSICAL THERAPY | Facility: HOSPITAL | Age: 33
End: 2018-08-10

## 2018-09-17 DIAGNOSIS — M25.561 RIGHT KNEE PAIN, UNSPECIFIED CHRONICITY: Primary | ICD-10-CM

## 2018-09-24 ENCOUNTER — OFFICE VISIT (OUTPATIENT)
Dept: ORTHOPEDIC SURGERY | Facility: CLINIC | Age: 33
End: 2018-09-24

## 2018-09-24 VITALS — BODY MASS INDEX: 30.01 KG/M2 | WEIGHT: 209.6 LBS | HEIGHT: 70 IN

## 2018-09-24 DIAGNOSIS — M17.11 PRIMARY OSTEOARTHRITIS OF RIGHT KNEE: ICD-10-CM

## 2018-09-24 DIAGNOSIS — M25.561 RIGHT KNEE PAIN, UNSPECIFIED CHRONICITY: Primary | ICD-10-CM

## 2018-09-24 PROCEDURE — 99203 OFFICE O/P NEW LOW 30 MIN: CPT | Performed by: ORTHOPAEDIC SURGERY

## 2018-09-24 RX ORDER — BUSPIRONE HYDROCHLORIDE 5 MG/1
5 TABLET ORAL 3 TIMES DAILY
COMMUNITY
End: 2019-04-01

## 2018-09-24 RX ORDER — MELATONIN 200 MCG
3 TABLET ORAL NIGHTLY
COMMUNITY

## 2018-09-24 RX ORDER — SERTRALINE HYDROCHLORIDE 25 MG/1
25 TABLET, FILM COATED ORAL DAILY
COMMUNITY
End: 2019-03-20

## 2018-09-24 NOTE — PROGRESS NOTES
Arley Colbert Jr. is a 33 y.o. 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 X-rays done today.  Is been going on intermittently never quite went away since 2002.  He's had x-rays physical therapy which seems to aggravate and taken a lot of Motrin which is really help him very much.  Minimal swelling.  It is associated with occasional giving way and symptoms aggravated with activity better with rest.  He has not had any further testing.  It hurts him on a daily basis and limits his activities    Pain   This is a chronic problem. The current episode started more than 1 year ago. The problem occurs constantly. Associated symptoms include headaches and joint swelling. Pertinent negatives include no abdominal pain, chest pain, chills, fever, nausea, rash or vomiting. Associated symptoms comments: Stabbing, burning. The symptoms are aggravated by standing and walking (sitting, driving).        CONCURRENT MEDICAL HISTORY:    History reviewed. No pertinent past medical history.    No Known Allergies      Current Outpatient Prescriptions:   •  busPIRone (BUSPAR) 5 MG tablet, Take 5 mg by mouth 3 (Three) Times a Day., Disp: , Rfl:   •  Melatonin 3 MG tablet, Take  by mouth., Disp: , Rfl:   •  sertraline (ZOLOFT) 25 MG tablet, Take 25 mg by mouth Daily., Disp: , Rfl:     Past Surgical History:   Procedure Laterality Date   • CYSTOSCOPY W/ LASER LITHOTRIPSY     • VASECTOMY         Family History   Problem Relation Age of Onset   • Diabetes Other        Social History     Social History   • Marital status:      Spouse name: N/A   • Number of children: N/A   • Years of education: N/A     Occupational History   • Not on file.     Social History Main Topics   • Smoking status: Current Every Day Smoker     Packs/day:  "1.00     Years: 15.00     Types: Cigarettes   • Smokeless tobacco: Never Used   • Alcohol use 0.6 oz/week     1 Cans of beer per week   • Drug use: Unknown   • Sexual activity: Defer     Other Topics Concern   • Not on file     Social History Narrative   • No narrative on file        Review of Systems   Constitutional: Negative for chills and fever.   HENT: Positive for tinnitus. Negative for facial swelling.    Eyes: Negative for photophobia.   Respiratory: Negative for apnea and shortness of breath.    Cardiovascular: Negative for chest pain and leg swelling.   Gastrointestinal: Negative for abdominal pain, nausea and vomiting.   Genitourinary: Negative for dysuria.   Musculoskeletal: Positive for joint swelling.        Stiffness   Skin: Negative for color change and rash.   Neurological: Positive for headaches. Negative for seizures and syncope.   Psychiatric/Behavioral: Positive for sleep disturbance. Negative for behavioral problems and dysphoric mood. The patient is nervous/anxious.    All other systems reviewed and are negative.      PHYSICAL EXAMINATION:       Ht 177.8 cm (70\")   Wt 95.1 kg (209 lb 9.6 oz)   BMI 30.07 kg/m²     Physical Exam   Constitutional: He is oriented to person, place, and time. He appears well-developed and well-nourished.   HENT:   Head: Normocephalic and atraumatic.   Eyes: Pupils are equal, round, and reactive to light. EOM are normal.   Neck: Neck supple. No tracheal deviation present.   Pulmonary/Chest: Effort normal.   Musculoskeletal: Normal range of motion. He exhibits tenderness. He exhibits no edema or deformity.   Neurological: He is alert and oriented to person, place, and time.   Skin: Skin is warm and dry. No erythema.   Psychiatric: He has a normal mood and affect.   Vitals reviewed.      GAIT:     [x]  Normal  []  Antalgic    Assistive device: [x]  None  []  Walker     []  Crutches  []  Cane     []  Wheelchair  []  Stretcher    Ortho Exam   tender anteromedial joint " line.  Ligaments are grossly stable Gregorio's reproduces pain without clunking Lachman is negative calf is negative.  Trace effusion today.  Good motion of the hip straight raising is negative.        No results found.  X-rays weightbearing films show medial joint space narrowing right greater than left      ASSESSMENT:    Diagnoses and all orders for this visit:    Right knee pain, unspecified chronicity  -     MRI Knee Right Without Contrast; Future    Primary osteoarthritis of right knee    Other orders  -     sertraline (ZOLOFT) 25 MG tablet; Take 25 mg by mouth Daily.  -     busPIRone (BUSPAR) 5 MG tablet; Take 5 mg by mouth 3 (Three) Times a Day.  -     Melatonin 3 MG tablet; Take  by mouth.          PLAN this is a 16 year problem and a 33-year-old.  The going on almost half of his life.  Certainly at this point and we need to look for a structural issue he does have mechanical symptoms and giving way on a daily basis.  He has not responded as far to conservative care including physical therapy, medications, activity modifications.  MRI scan to develop further treatment plan at that point I believe it's medically necessary at this point.    No Follow-up on file.    Bogdan De La Vega MD

## 2018-10-19 ENCOUNTER — HOSPITAL ENCOUNTER (OUTPATIENT)
Dept: MRI IMAGING | Facility: HOSPITAL | Age: 33
Discharge: HOME | End: 2018-10-19
Attending: ORTHOPAEDIC SURGERY | Admitting: ORTHOPAEDIC SURGERY

## 2018-10-19 DIAGNOSIS — M25.561 RIGHT KNEE PAIN, UNSPECIFIED CHRONICITY: ICD-10-CM

## 2018-10-19 PROCEDURE — 73721 MRI JNT OF LWR EXTRE W/O DYE: CPT

## 2019-02-27 ENCOUNTER — OFFICE VISIT (OUTPATIENT)
Dept: ORTHOPEDIC SURGERY | Facility: CLINIC | Age: 34
End: 2019-02-27

## 2019-02-27 VITALS — WEIGHT: 205 LBS | BODY MASS INDEX: 29.35 KG/M2 | HEIGHT: 70 IN

## 2019-02-27 DIAGNOSIS — M17.11 PRIMARY OSTEOARTHRITIS OF RIGHT KNEE: Primary | ICD-10-CM

## 2019-02-27 DIAGNOSIS — M25.561 RIGHT KNEE PAIN, UNSPECIFIED CHRONICITY: ICD-10-CM

## 2019-02-27 PROCEDURE — 99213 OFFICE O/P EST LOW 20 MIN: CPT | Performed by: ORTHOPAEDIC SURGERY

## 2019-02-27 PROCEDURE — 20610 DRAIN/INJ JOINT/BURSA W/O US: CPT | Performed by: ORTHOPAEDIC SURGERY

## 2019-02-27 NOTE — PROGRESS NOTES
"Arley Colbert Jr. is a 33 y.o. male returns for     Chief Complaint   Patient presents with   • Right Knee - Follow-up       HISTORY OF PRESENT ILLNESS: Patient is here today for recheck of right knee. Patient was last seen in September and had MRI scan done in October 2018. Patient states that his knee pain is 8/10 today.  Pain is worse now than it does it seems worse with activity and better with rest still causing problems       CONCURRENT MEDICAL HISTORY:    No past medical history on file.    No Known Allergies      Current Outpatient Medications:   •  busPIRone (BUSPAR) 5 MG tablet, Take 5 mg by mouth 3 (Three) Times a Day., Disp: , Rfl:   •  Melatonin 3 MG tablet, Take  by mouth., Disp: , Rfl:   •  sertraline (ZOLOFT) 25 MG tablet, Take 25 mg by mouth Daily., Disp: , Rfl:     Past Surgical History:   Procedure Laterality Date   • CYSTOSCOPY W/ LASER LITHOTRIPSY     • VASECTOMY             ROS: Review of systems has been updated as of today's date.  All other systems are negative except as noted previously.    PHYSICAL EXAMINATION:       Ht 177.8 cm (70\")   Wt 93 kg (205 lb)   BMI 29.41 kg/m²     Physical Exam   Constitutional: He is oriented to person, place, and time. He appears well-developed.   HENT:   Head: Normocephalic and atraumatic.   Eyes: EOM are normal. Pupils are equal, round, and reactive to light.   Neck: Neck supple. No tracheal deviation present.   Pulmonary/Chest: Effort normal.   Musculoskeletal: He exhibits tenderness. He exhibits no edema or deformity.   Neurological: He is alert and oriented to person, place, and time.   Skin: Skin is warm and dry. No erythema.   Psychiatric: He has a normal mood and affect.       GAIT:     [x]  Normal  []  Antalgic    Assistive device: []  None  []  Walker     []  Crutches  []  Cane     []  Wheelchair  []  Stretcher    Ortho Exam  Medial joint line.  Not effusion.  Ligaments are stable grossly.  Drawer is negative.  Calf is negative.    No results " found.  MRI scan is reviewed there is mild change at the distal insertion it does not look like a complete tear there might be a mild interstitial changes here but the ligament appears intact there is no effusion the knee indicating inflammation.  I have      Large Joint Arthrocentesis: R knee  Date/Time: 2/27/2019 2:29 PM  Consent given by: patient  Site marked: site marked  Timeout: Immediately prior to procedure a time out was called to verify the correct patient, procedure, equipment, support staff and site/side marked as required   Supporting Documentation  Indications: pain   Procedure Details  Location: knee - R knee  Preparation: Patient was prepped and draped in the usual sterile fashion  Needle size: 22 G  Approach: anteromedial  Medications administered: 4 mL lidocaine 1 %, 2 mL triamcinolone acetonide 40 MG/ML  Patient tolerance: patient tolerated the procedure well with no immediate complications          ASSESSMENT:    Diagnoses and all orders for this visit:    Primary osteoarthritis of right knee  -     Large Joint Arthrocentesis: R knee    Right knee pain, unspecified chronicity  -     Large Joint Arthrocentesis: R knee          PLAN I do not think at this point certainly that he has a significant ACL problem.  He does have inflammation I think at this point were going to inject the knee intra-articularly check back in the office in 3 weeks as he is getting along I think based upon this might consider a diagnostic arthroscopy if his symptoms still persist.    No Follow-up on file.      This document has been electronically signed by Bogdan De La Vega MD on February 27, 2019 4:31 PM

## 2019-03-20 ENCOUNTER — OFFICE VISIT (OUTPATIENT)
Dept: ORTHOPEDIC SURGERY | Facility: CLINIC | Age: 34
End: 2019-03-20

## 2019-03-20 VITALS — WEIGHT: 227 LBS | HEIGHT: 70 IN | BODY MASS INDEX: 32.5 KG/M2

## 2019-03-20 DIAGNOSIS — M17.11 PRIMARY OSTEOARTHRITIS OF RIGHT KNEE: Primary | ICD-10-CM

## 2019-03-20 DIAGNOSIS — M25.561 RIGHT KNEE PAIN, UNSPECIFIED CHRONICITY: ICD-10-CM

## 2019-03-20 PROCEDURE — 99213 OFFICE O/P EST LOW 20 MIN: CPT | Performed by: ORTHOPAEDIC SURGERY

## 2019-03-20 RX ORDER — DULOXETIN HYDROCHLORIDE 60 MG/1
60 CAPSULE, DELAYED RELEASE ORAL DAILY
COMMUNITY

## 2019-03-20 RX ORDER — METHOCARBAMOL 500 MG/1
500 TABLET, FILM COATED ORAL 3 TIMES DAILY PRN
COMMUNITY

## 2019-03-20 RX ORDER — GABAPENTIN 300 MG/1
300 CAPSULE ORAL 3 TIMES DAILY
COMMUNITY
End: 2019-04-01

## 2019-03-20 RX ORDER — AMLODIPINE BESYLATE 10 MG/1
10 TABLET ORAL DAILY
COMMUNITY

## 2019-03-20 RX ORDER — BUPIVACAINE HCL/0.9 % NACL/PF 0.1 %
2 PLASTIC BAG, INJECTION (ML) EPIDURAL ONCE
Status: CANCELLED | OUTPATIENT
Start: 2019-04-04 | End: 2019-03-20

## 2019-03-20 NOTE — PROGRESS NOTES
"Arley Colbert Jr. is a 34 y.o. male returns for     Chief Complaint   Patient presents with   • Right Knee - Follow-up       HISTORY OF PRESENT ILLNESS: f/u right knee pain, steroid injection done on 2/27/2019 did not help.  He did not get much relief at all from the injection still having symptoms of catching buckling giving way.       CONCURRENT MEDICAL HISTORY:    No past medical history on file.    No Known Allergies      Current Outpatient Medications:   •  busPIRone (BUSPAR) 5 MG tablet, Take 5 mg by mouth 3 (Three) Times a Day., Disp: , Rfl:   •  Melatonin 3 MG tablet, Take  by mouth., Disp: , Rfl:   •  methocarbamol (ROBAXIN) 500 MG tablet, Take 500 mg by mouth 4 (Four) Times a Day., Disp: , Rfl:     Past Surgical History:   Procedure Laterality Date   • CYSTOSCOPY W/ LASER LITHOTRIPSY     • VASECTOMY             ROS: Review of systems has been updated as of today's date.  All other systems are negative except as noted previously.  Still positive mechanical symptoms, i.e., buckling catching and giving way with gait abnormality.    PHYSICAL EXAMINATION:       Ht 177.8 cm (70\")   Wt 103 kg (227 lb)   BMI 32.57 kg/m²     Physical Exam   Constitutional: He is oriented to person, place, and time. He appears well-developed.   HENT:   Head: Normocephalic and atraumatic.   Eyes: EOM are normal. Pupils are equal, round, and reactive to light.   Neck: Neck supple. No tracheal deviation present.   Pulmonary/Chest: Effort normal.   Musculoskeletal: He exhibits tenderness. He exhibits no edema or deformity.   Neurological: He is alert and oriented to person, place, and time.   Skin: Skin is warm and dry. No erythema.   Psychiatric: He has a normal mood and affect.       GAIT:     [x]  Normal  []  Antalgic    Assistive device: [x]  None  []  Walker     []  Crutches  []  Cane     []  Wheelchair  []  Stretcher    Ortho Exam  Drawer test is symmetric.  No obvious instability pivot shift is negative.  Tender over the medial " joint line no effusion ligaments are stable calf is negative neurovascular intact    No results found.          ASSESSMENT:    Diagnoses and all orders for this visit:    Primary osteoarthritis of right knee    Right knee pain, unspecified chronicity    Other orders  -     methocarbamol (ROBAXIN) 500 MG tablet; Take 500 mg by mouth 4 (Four) Times a Day.          PLAN point would consider diagnostic arthroscopy.  I would not recommend ACL reconstruction at this time he has really no clinical findings of this.  MRI findings of the cruciate injury are minimal I believe.  We discussed diagnostic arthroscopy at this time which is reasonable.  With usually at least get the information hopefully help this.  I would not plan ACL reconstruction.  Risks and benefits of surgery are failed to resolve his pain, bleeding, blood clot, infection, neurovascular injury, medical anesthetic complications, failure to diagnose the issue intra-articularly.  He understands that he may require further surgery down the road but will hold off on doing anything to his ACL at this point in this 34-year-old.  He would like to proceed as planned after understanding detailed informed consent    No Follow-up on file.      This document has been electronically signed by Moriah Davis MA on March 20, 2019 8:24 AM

## 2019-04-01 ENCOUNTER — APPOINTMENT (OUTPATIENT)
Dept: PREADMISSION TESTING | Facility: HOSPITAL | Age: 34
End: 2019-04-01

## 2019-04-01 VITALS
DIASTOLIC BLOOD PRESSURE: 90 MMHG | OXYGEN SATURATION: 100 % | RESPIRATION RATE: 20 BRPM | SYSTOLIC BLOOD PRESSURE: 138 MMHG | WEIGHT: 224 LBS | HEIGHT: 71 IN | HEART RATE: 80 BPM | BODY MASS INDEX: 31.36 KG/M2

## 2019-04-01 PROCEDURE — 93010 ELECTROCARDIOGRAM REPORT: CPT | Performed by: INTERNAL MEDICINE

## 2019-04-01 PROCEDURE — 93005 ELECTROCARDIOGRAM TRACING: CPT

## 2019-04-01 RX ORDER — GABAPENTIN 600 MG/1
600 TABLET ORAL 3 TIMES DAILY
COMMUNITY

## 2019-04-01 RX ORDER — SODIUM CHLORIDE, SODIUM GLUCONATE, SODIUM ACETATE, POTASSIUM CHLORIDE, AND MAGNESIUM CHLORIDE 526; 502; 368; 37; 30 MG/100ML; MG/100ML; MG/100ML; MG/100ML; MG/100ML
1000 INJECTION, SOLUTION INTRAVENOUS CONTINUOUS
Status: CANCELLED | OUTPATIENT
Start: 2019-04-04

## 2019-04-01 RX ORDER — BUSPIRONE HYDROCHLORIDE 10 MG/1
10 TABLET ORAL 3 TIMES DAILY
COMMUNITY

## 2019-04-01 NOTE — DISCHARGE INSTRUCTIONS
[facility]  Pre-op Information and Guidelines    You will be called after 2 p.m. the day before your surgery (Friday for Monday surgery) and notified of your time for arrival and approximate surgery time.  If you have not received a call by 4P.M., please contact Same Day Surgery at [phone] of if outside [facility] call [phone].    Please Follow these Important Safety Guidelines:    • The morning of your procedure, take only the medications listed below with   A sip of water:_____________________________________________       ______________________________________________    • DO NOT eat or drink anything after 12:00 midnight the night before surgery  Specific instructions concerning drinking clear liquids will be discussed during  the pre-surgery instruction call the day before your surgery.    • If you take a blood thinner (ex. Plavix, Coumadin, aspirin), ask your doctor when to stop it before surgery  STOP DATE: _________________    • Only 2 visitors are allowed in patient rooms at a time  Your visitors will be asked to wait in the lobby until the admission process is complete with the exception of a parent with a child and patients in need of special assistance.    • YOU CANNOT DRIVE YOURSELF HOME  You must be accompanied by someone who will be responsible for driving you home after surgery and for your care at home.    • DO NOT chew gum, use breath mints, hard candy, or smoke the day of surgery  • DO NOT drink alcohol for at least 24 hours before your surgery  • DO NOT wear any jewelry and remove all body piercing before coming to the hospital  • DO NOT wear make-up to the hospital  • If you are having surgery on an extremity (arm/leg/foot) remove nail polish/artificial nails on the surgical side  • Clothing, glasses, contacts, dentures, and hairpieces must be removed before surgery  • Bathe the night before or the morning of your surgery and do not use powders/lotions on  skin.

## 2019-04-01 NOTE — PAT
Chlorhexidine scrub given with instruction sheet.  Instruction reviewed in PAT, understanding verbalized.

## 2019-04-03 ENCOUNTER — ANESTHESIA EVENT (OUTPATIENT)
Dept: PERIOP | Facility: HOSPITAL | Age: 34
End: 2019-04-03

## 2019-04-04 ENCOUNTER — ANESTHESIA (OUTPATIENT)
Dept: PERIOP | Facility: HOSPITAL | Age: 34
End: 2019-04-04

## 2019-04-04 ENCOUNTER — HOSPITAL ENCOUNTER (OUTPATIENT)
Facility: HOSPITAL | Age: 34
Setting detail: HOSPITAL OUTPATIENT SURGERY
Discharge: HOME | End: 2019-04-04
Attending: ORTHOPAEDIC SURGERY | Admitting: ORTHOPAEDIC SURGERY

## 2019-04-04 VITALS
WEIGHT: 224.65 LBS | BODY MASS INDEX: 31.45 KG/M2 | HEART RATE: 77 BPM | RESPIRATION RATE: 18 BRPM | OXYGEN SATURATION: 96 % | DIASTOLIC BLOOD PRESSURE: 94 MMHG | HEIGHT: 71 IN | TEMPERATURE: 97.5 F | SYSTOLIC BLOOD PRESSURE: 156 MMHG

## 2019-04-04 DIAGNOSIS — M17.11 PRIMARY OSTEOARTHRITIS OF RIGHT KNEE: ICD-10-CM

## 2019-04-04 PROCEDURE — 25010000002 DEXAMETHASONE PER 1 MG: Performed by: NURSE ANESTHETIST, CERTIFIED REGISTERED

## 2019-04-04 PROCEDURE — 29875 ARTHRS KNEE SURG SYNVCT LMTD: CPT | Performed by: ORTHOPAEDIC SURGERY

## 2019-04-04 PROCEDURE — 25010000002 HYDROMORPHONE PER 4 MG: Performed by: NURSE ANESTHETIST, CERTIFIED REGISTERED

## 2019-04-04 PROCEDURE — 25010000002 KETOROLAC TROMETHAMINE PER 15 MG: Performed by: NURSE ANESTHETIST, CERTIFIED REGISTERED

## 2019-04-04 PROCEDURE — 25010000002 PROPOFOL 10 MG/ML EMULSION: Performed by: NURSE ANESTHETIST, CERTIFIED REGISTERED

## 2019-04-04 PROCEDURE — 25010000002 FENTANYL CITRATE (PF) 100 MCG/2ML SOLUTION: Performed by: NURSE ANESTHETIST, CERTIFIED REGISTERED

## 2019-04-04 PROCEDURE — 25010000002 MIDAZOLAM PER 1 MG: Performed by: NURSE ANESTHETIST, CERTIFIED REGISTERED

## 2019-04-04 PROCEDURE — 25010000002 ONDANSETRON PER 1 MG: Performed by: NURSE ANESTHETIST, CERTIFIED REGISTERED

## 2019-04-04 RX ORDER — OXYCODONE HYDROCHLORIDE AND ACETAMINOPHEN 5; 325 MG/1; MG/1
1-2 TABLET ORAL EVERY 4 HOURS PRN
Qty: 30 TABLET | Refills: 0 | Status: SHIPPED | OUTPATIENT
Start: 2019-04-04 | End: 2019-05-06

## 2019-04-04 RX ORDER — HYDROMORPHONE HCL 110MG/55ML
PATIENT CONTROLLED ANALGESIA SYRINGE INTRAVENOUS AS NEEDED
Status: DISCONTINUED | OUTPATIENT
Start: 2019-04-04 | End: 2019-04-04 | Stop reason: SURG

## 2019-04-04 RX ORDER — FENTANYL CITRATE 50 UG/ML
INJECTION, SOLUTION INTRAMUSCULAR; INTRAVENOUS AS NEEDED
Status: DISCONTINUED | OUTPATIENT
Start: 2019-04-04 | End: 2019-04-04 | Stop reason: SURG

## 2019-04-04 RX ORDER — MIDAZOLAM HYDROCHLORIDE 1 MG/ML
INJECTION INTRAMUSCULAR; INTRAVENOUS AS NEEDED
Status: DISCONTINUED | OUTPATIENT
Start: 2019-04-04 | End: 2019-04-04 | Stop reason: SURG

## 2019-04-04 RX ORDER — SODIUM CHLORIDE, SODIUM LACTATE, POTASSIUM CHLORIDE, AND CALCIUM CHLORIDE .6; .31; .03; .02 G/100ML; G/100ML; G/100ML; G/100ML
IRRIGANT IRRIGATION AS NEEDED
Status: DISCONTINUED | OUTPATIENT
Start: 2019-04-04 | End: 2019-04-04 | Stop reason: HOSPADM

## 2019-04-04 RX ORDER — PROPOFOL 10 MG/ML
VIAL (ML) INTRAVENOUS AS NEEDED
Status: DISCONTINUED | OUTPATIENT
Start: 2019-04-04 | End: 2019-04-04 | Stop reason: SURG

## 2019-04-04 RX ORDER — ONDANSETRON 2 MG/ML
INJECTION INTRAMUSCULAR; INTRAVENOUS AS NEEDED
Status: DISCONTINUED | OUTPATIENT
Start: 2019-04-04 | End: 2019-04-04 | Stop reason: SURG

## 2019-04-04 RX ORDER — DEXAMETHASONE SODIUM PHOSPHATE 4 MG/ML
INJECTION, SOLUTION INTRA-ARTICULAR; INTRALESIONAL; INTRAMUSCULAR; INTRAVENOUS; SOFT TISSUE AS NEEDED
Status: DISCONTINUED | OUTPATIENT
Start: 2019-04-04 | End: 2019-04-04 | Stop reason: SURG

## 2019-04-04 RX ORDER — SODIUM CHLORIDE, SODIUM GLUCONATE, SODIUM ACETATE, POTASSIUM CHLORIDE, AND MAGNESIUM CHLORIDE 526; 502; 368; 37; 30 MG/100ML; MG/100ML; MG/100ML; MG/100ML; MG/100ML
1000 INJECTION, SOLUTION INTRAVENOUS CONTINUOUS
Status: DISCONTINUED | OUTPATIENT
Start: 2019-04-04 | End: 2019-04-04 | Stop reason: HOSPADM

## 2019-04-04 RX ORDER — KETOROLAC TROMETHAMINE 30 MG/ML
INJECTION, SOLUTION INTRAMUSCULAR; INTRAVENOUS AS NEEDED
Status: DISCONTINUED | OUTPATIENT
Start: 2019-04-04 | End: 2019-04-04 | Stop reason: SURG

## 2019-04-04 RX ORDER — BUPIVACAINE HCL/0.9 % NACL/PF 0.1 %
2 PLASTIC BAG, INJECTION (ML) EPIDURAL ONCE
Status: COMPLETED | OUTPATIENT
Start: 2019-04-04 | End: 2019-04-04

## 2019-04-04 RX ORDER — LIDOCAINE HYDROCHLORIDE 20 MG/ML
INJECTION, SOLUTION INFILTRATION; PERINEURAL AS NEEDED
Status: DISCONTINUED | OUTPATIENT
Start: 2019-04-04 | End: 2019-04-04 | Stop reason: SURG

## 2019-04-04 RX ADMIN — SODIUM CHLORIDE, SODIUM GLUCONATE, SODIUM ACETATE, POTASSIUM CHLORIDE, AND MAGNESIUM CHLORIDE: 526; 502; 368; 37; 30 INJECTION, SOLUTION INTRAVENOUS at 12:09

## 2019-04-04 RX ADMIN — ONDANSETRON 4 MG: 2 INJECTION INTRAMUSCULAR; INTRAVENOUS at 12:55

## 2019-04-04 RX ADMIN — SODIUM CHLORIDE, SODIUM GLUCONATE, SODIUM ACETATE, POTASSIUM CHLORIDE, AND MAGNESIUM CHLORIDE 1000 ML: 526; 502; 368; 37; 30 INJECTION, SOLUTION INTRAVENOUS at 10:46

## 2019-04-04 RX ADMIN — Medication 2 G: at 12:28

## 2019-04-04 RX ADMIN — KETOROLAC TROMETHAMINE 30 MG: 30 INJECTION, SOLUTION INTRAMUSCULAR at 12:59

## 2019-04-04 RX ADMIN — FENTANYL CITRATE 50 MCG: 50 INJECTION, SOLUTION INTRAMUSCULAR; INTRAVENOUS at 12:17

## 2019-04-04 RX ADMIN — PROPOFOL 200 MG: 10 INJECTION, EMULSION INTRAVENOUS at 12:17

## 2019-04-04 RX ADMIN — LIDOCAINE HYDROCHLORIDE 50 MG: 20 INJECTION, SOLUTION INFILTRATION; PERINEURAL at 12:17

## 2019-04-04 RX ADMIN — HYDROMORPHONE HYDROCHLORIDE 1 MG: 2 INJECTION INTRAMUSCULAR; INTRAVENOUS; SUBCUTANEOUS at 12:37

## 2019-04-04 RX ADMIN — DEXAMETHASONE SODIUM PHOSPHATE 4 MG: 4 INJECTION, SOLUTION INTRAMUSCULAR; INTRAVENOUS at 12:28

## 2019-04-04 RX ADMIN — MIDAZOLAM HYDROCHLORIDE 2 MG: 2 INJECTION, SOLUTION INTRAMUSCULAR; INTRAVENOUS at 12:09

## 2019-04-04 NOTE — ANESTHESIA PREPROCEDURE EVALUATION
Anesthesia Evaluation     Patient summary reviewed   NPO Solid Status: > 8 hours  NPO Liquid Status: > 8 hours           Airway   Mallampati: II  TM distance: >3 FB  Neck ROM: full  No difficulty expected  Dental - normal exam     Pulmonary - normal exam   Cardiovascular - normal exam  Exercise tolerance: good (4-7 METS)    NYHA Classification: II    (+) hypertension,       Neuro/Psych  (+) psychiatric history Anxiety,     GI/Hepatic/Renal/Endo    (+)  GERD well controlled,      Musculoskeletal     (+) joint swelling,   Abdominal    Substance History      OB/GYN          Other   (+) arthritis                     Anesthesia Plan    ASA 2     general

## 2019-04-04 NOTE — OP NOTE
Procedure(s):  KNEE ARTHROSCOPY WITH CHONDROPLASTY DEBRIDEMENT  Procedure Note    Arley Colbert Jr.  4/4/2019    Pre-op Diagnosis:   Primary osteoarthritis of right knee [M17.11]    Post-op Diagnosis:     Post-Op Diagnosis Codes:     * Primary osteoarthritis of right knee [M17.11]    Procedure/CPT® Codes:      Procedure(s):  KNEE ARTHROSCOPY WITH CHONDROPLASTY DEBRIDEMENT right    Surgeon(s):  Bogdan De La Vega MD    Anesthesia: Right    Staff:   Circulator: Haydee Jones RN  Scrub Person: Diamond Wiseman  Assistant: Alayna Quintero MA    Estimated Blood Loss: minimal    Specimens:                None      Drains:      Findings: ACL appeared intact.  Medial lateral meniscus intact.  Joint surfaces grossly intact minimal change patellofemoral joint.  Marked thickened synovitis which appeared chronic and thickened throughout the joint significant debridement done.  Pictures taken intra-articularly    Complications: None    Dictation: Indications: 34-year-old gentleman with a long history of knee pain.  MRI was certainly inconclusive is been injected intra-articularly this is ongoing for over a decade.  He is not responded to medication the modifications bracing etc. is for diagnostic arthroscopy and debridement at this point.  Risk and benefits of been explained in detail including but not limited to bleeding, blood clot, need for further surgery, need for further procedure, failure to resolve his pain, neurovascular injury, medical anesthetic comp occasions, etc. he understands we will proceed as planned.    Procedure: After adequate general anesthesia obtained patient leg prepped and draped usual sterile fashion.  Tourniquet was inflated timeout was performed prior to surgery.    Anterior lateral portals are made the knee sequentially examined.  Very thickened fat pad is noted debridement is done to aid in visualization.  Marked tenosynovitis was noted attaching adherent to the intercondylar notch and  anterior and posterior cruciate ligaments.  Also blocking entrance into the medial lateral compartment this was debrided significantly.  Synovectomy and debridement was taken up both medial aspect of a very large thickened medial plica were pictures were taken extending across the suprapatellar pouch showed a large thickened band laterally.  Mild inflammation was noted here pictures were taken both were debrided with a suction shaver.  The medial lateral gutters were cleaned out as were the remainder of the thickened synovium.  The joint surface itself was noted to be intact with the trochlea patellofemoral joint.  No loose bodies were noted.  ACL was probed PCL probed noted to be intact medial lateral meniscus were probed in their entirety.  As mentioned previously the medial lateral compartment looked actually very good with no significant arthritic change.  Secondary inspection revealed no further pathology further debridement was done of the synovial plical band at the superior compartment.  Instruments were then withdrawn portals closed with 4-0 nylon suture.  The was injected with Xylocaine with epinephrine to initial pain control and bleeding.  Sterile bulky dressing was applied he tore the procedure well without complication.    Bogdan De La Vega MD  04/04/19  1:03 PM

## 2019-04-04 NOTE — INTERVAL H&P NOTE
H&P updated. The patient was examined and the following changes are noted:  He is for knee arthroscopy on the right knee.  We have again gone over the risks benefits above.  We will proceed his plan is detailed informed consent is given.     Temp:  [97.6 °F (36.4 °C)] 97.6 °F (36.4 °C)  Heart Rate:  [72] 72  Resp:  [18] 18  BP: (165)/(93) 165/93    No Known Allergies    Prior to Admission medications    Medication Sig Start Date End Date Taking? Authorizing Provider   amLODIPine (NORVASC) 10 MG tablet Take 10 mg by mouth Daily. 1/2 tab   Yes Franny Arredondo MD   busPIRone (BUSPAR) 10 MG tablet Take 10 mg by mouth 3 (Three) Times a Day.   Yes Franny Arredondo MD   DULoxetine (CYMBALTA) 60 MG capsule Take 60 mg by mouth Daily.   Yes Franny Arredondo MD   gabapentin (NEURONTIN) 600 MG tablet Take 600 mg by mouth 3 (Three) Times a Day. 1 tab in the morning.,  1.5 noon, and 2 tabs at night   Yes Franny Arredondo MD   Melatonin 3 MG tablet Take 3 mg by mouth Every Night. 2 tabs   Yes Franny Arredondo MD   methocarbamol (ROBAXIN) 500 MG tablet Take 500 mg by mouth 3 (Three) Times a Day As Needed.   Yes Franny Arredondo MD       Past Medical History:   Diagnosis Date   • Hypertension    • Spinal stenosis        Past Surgical History:   Procedure Laterality Date   • CYSTOSCOPY W/ LASER LITHOTRIPSY     • LYMPH NODE BIOPSY      anterior neck   • VASECTOMY         Social History     Socioeconomic History   • Marital status:      Spouse name: Not on file   • Number of children: Not on file   • Years of education: Not on file   • Highest education level: Not on file   Tobacco Use   • Smoking status: Current Every Day Smoker     Packs/day: 1.00     Years: 15.00     Pack years: 15.00     Types: Cigarettes   • Smokeless tobacco: Never Used   Substance and Sexual Activity   • Alcohol use: Yes     Alcohol/week: 0.6 oz     Types: 1 Cans of beer per week     Comment: socially   • Drug use: Defer   •  Sexual activity: Defer       Family History   Problem Relation Age of Onset   • Diabetes Other          Primary osteoarthritis of right knee      Review of Systems   HENT: Negative.    Respiratory: Negative.    Cardiovascular: Negative.    Gastrointestinal: Negative.    All other systems reviewed and are negative.

## 2019-04-04 NOTE — ANESTHESIA POSTPROCEDURE EVALUATION
Patient: Arley Colbert Jr.    Procedure Summary     Date:  04/04/19 Room / Location:  [facility] OR 09 / [facility] OR    Anesthesia Start:  1212 Anesthesia Stop:  1304    Procedure:  KNEE ARTHROSCOPY WITH CHONDROPLASTY DEBRIDEMENT (Right Knee) Diagnosis:       Primary osteoarthritis of right knee      (Primary osteoarthritis of right knee [M17.11])    Surgeon:  Bogdan De La Vega MD Provider:  Capo Lisa MD    Anesthesia Type:  general ASA Status:  2          Anesthesia Type: general  Last vitals  BP   165/93 (04/04/19 1034)   Temp   97.6 °F (36.4 °C) (04/04/19 1034)   Pulse   72 (04/04/19 1034)   Resp   18 (04/04/19 1034)     SpO2   99 % (04/04/19 1034)     Post Anesthesia Care and Evaluation    Patient location during evaluation: PACU  Patient participation: waiting for patient participation  Level of consciousness: responsive to physical stimuli  Pain management: adequate  Airway patency: patent  Anesthetic complications: No anesthetic complications  PONV Status: none  Cardiovascular status: acceptable  Respiratory status: acceptable  Hydration status: acceptable

## 2019-04-04 NOTE — ANESTHESIA PROCEDURE NOTES
Airway  Urgency: elective    Airway not difficult    General Information and Staff    Patient location during procedure: OR  CRNA: Joey Hess CRNA    Indications and Patient Condition  Indications for airway management: airway protection    Preoxygenated: yes  MILS maintained throughout  Mask difficulty assessment: 0 - not attempted    Final Airway Details  Final airway type: supraglottic airway      Successful airway: I-gel  Size 4    Number of attempts at approach: 1

## 2019-04-11 ENCOUNTER — OFFICE VISIT (OUTPATIENT)
Dept: ORTHOPEDIC SURGERY | Facility: CLINIC | Age: 34
End: 2019-04-11

## 2019-04-11 VITALS — WEIGHT: 224 LBS | HEIGHT: 71 IN | BODY MASS INDEX: 31.36 KG/M2

## 2019-04-11 DIAGNOSIS — Z98.890 STATUS POST ARTHROSCOPY OF RIGHT KNEE: Primary | ICD-10-CM

## 2019-04-11 PROCEDURE — 99024 POSTOP FOLLOW-UP VISIT: CPT | Performed by: ORTHOPAEDIC SURGERY

## 2019-04-11 NOTE — PROGRESS NOTES
Arley Colbert Jr. is a 34 y.o. male is s/p  KNEE ARTHROSCOPY WITH CHONDROPLASTY DEBRIDEMENT - Right     Chief Complaint   Patient presents with   • Post-op     Right knee        HISTORY OF PRESENT ILLNESS: Patient is here today s/p right knee arthroscopy with chondroplasty debridement on 4/4/2019. Patient states that his knee pain today is 8/10.  Doing pretty well using no ambulatory aids.       No Known Allergies      Current Outpatient Medications:   •  amLODIPine (NORVASC) 10 MG tablet, Take 10 mg by mouth Daily. 1/2 tab, Disp: , Rfl:   •  busPIRone (BUSPAR) 10 MG tablet, Take 10 mg by mouth 3 (Three) Times a Day., Disp: , Rfl:   •  DULoxetine (CYMBALTA) 60 MG capsule, Take 60 mg by mouth Daily., Disp: , Rfl:   •  gabapentin (NEURONTIN) 600 MG tablet, Take 600 mg by mouth 3 (Three) Times a Day. 1 tab in the morning.,  1.5 noon, and 2 tabs at night, Disp: , Rfl:   •  Melatonin 3 MG tablet, Take 3 mg by mouth Every Night. 2 tabs, Disp: , Rfl:   •  methocarbamol (ROBAXIN) 500 MG tablet, Take 500 mg by mouth 3 (Three) Times a Day As Needed., Disp: , Rfl:   •  oxyCODONE-acetaminophen (PERCOCET) 5-325 MG per tablet, Take 1 to 2 tablets by mouth Every 4 (Four) Hours As Needed for Pain., Disp: 30 tablet, Rfl: 0        PHYSICAL EXAMINATION:       Arley Colbert Jr. is a 34 y.o. male    Patient is awake and alert, answers questions appropriately and is in no apparent distress.    GAIT:     []  Normal  [x]  Antalgic    Assistive device: []  None  []  Walker     []  Crutches  []  Cane     []  Wheelchair  []  Stretcher    Ortho Exam  Wounds look good.  No sign of infection.  Minimal swelling calf is negative  No results found.        ASSESSMENT:    Diagnoses and all orders for this visit:    Status post arthroscopy of right knee          PLAN he is status post debridement of gone over his pictures with him today.  Knee exercises, suture removal.  We will follow-up in 2-3 weeks check his progress at that time overall he is doing  pretty well                This document has been electronically signed by Alayna Quintero MA on April 11, 2019 8:26 AM

## 2019-05-06 ENCOUNTER — OFFICE VISIT (OUTPATIENT)
Dept: ORTHOPEDIC SURGERY | Facility: CLINIC | Age: 34
End: 2019-05-06

## 2019-05-06 VITALS — BODY MASS INDEX: 31.24 KG/M2 | HEIGHT: 71 IN

## 2019-05-06 DIAGNOSIS — Z98.890 STATUS POST ARTHROSCOPY OF RIGHT KNEE: Primary | ICD-10-CM

## 2019-05-06 PROCEDURE — 99024 POSTOP FOLLOW-UP VISIT: CPT | Performed by: ORTHOPAEDIC SURGERY

## 2019-05-06 RX ORDER — TRAMADOL HYDROCHLORIDE 50 MG/1
50 TABLET ORAL EVERY 6 HOURS PRN
COMMUNITY

## 2019-05-06 NOTE — PROGRESS NOTES
Arley Colbert Jr. is a 34 y.o. male is s/p       Chief Complaint   Patient presents with   • Right Knee - Follow-up     Knee Arthroscopy        HISTORY OF PRESENT ILLNESS:  Patient is here today for follow up Right Knee Arthroscopy with Chondroplast Debridement on 4/4/2019. Patient states his knee pain today 2 out 10.  Improving less pain or discomfort overall is better than preoperatively       No Known Allergies      Current Outpatient Medications:   •  amLODIPine (NORVASC) 10 MG tablet, Take 10 mg by mouth Daily. 1/2 tab, Disp: , Rfl:   •  busPIRone (BUSPAR) 10 MG tablet, Take 10 mg by mouth 3 (Three) Times a Day., Disp: , Rfl:   •  DULoxetine (CYMBALTA) 60 MG capsule, Take 60 mg by mouth Daily., Disp: , Rfl:   •  gabapentin (NEURONTIN) 600 MG tablet, Take 600 mg by mouth 3 (Three) Times a Day. 1 tab in the morning.,  1.5 noon, and 2 tabs at night, Disp: , Rfl:   •  Melatonin 3 MG tablet, Take 3 mg by mouth Every Night. 2 tabs, Disp: , Rfl:   •  methocarbamol (ROBAXIN) 500 MG tablet, Take 500 mg by mouth 3 (Three) Times a Day As Needed., Disp: , Rfl:   •  traMADol (ULTRAM) 50 MG tablet, Take 50 mg by mouth Every 6 (Six) Hours As Needed for Moderate Pain ., Disp: , Rfl:     No fevers or chills.  No nausea or vomiting.      PHYSICAL EXAMINATION:       Arley Colbert Jr. is a 34 y.o. male    Patient is awake and alert, answers questions appropriately and is in no apparent distress.    GAIT:     [x]  Normal  [x]  Antalgic    Assistive device: []  None  []  Walker     []  Crutches  []  Cane     []  Wheelchair  []  Stretcher    Ortho Exam  Wounds look good.  Calf is negative.  Good most of knee walking without a limp  No results found.        ASSESSMENT:    Diagnoses and all orders for this visit:    Status post arthroscopy of right knee    Other orders  -     traMADol (ULTRAM) 50 MG tablet; Take 50 mg by mouth Every 6 (Six) Hours As Needed for Moderate Pain .          PLAN we will go ahead and release him at this point  continue his exercises will call with any problems whatsoever    No Follow-up on file.    Bogdan De La Vega MD

## (undated) DEVICE — TP SXN YANKR BLB TIP W/TBG 10F LF STRL

## (undated) DEVICE — GLV SURG SENSICARE PI PF LF 7 GRN STRL

## (undated) DEVICE — SOL IRR LACT RNG BG 3000ML

## (undated) DEVICE — GLV SURG NEOLON 2G PF LF 6.5 STRL

## (undated) DEVICE — STERILE POLYISOPRENE POWDER-FREE SURGICAL GLOVES WITH EMOLLIENT COATING: Brand: PROTEXIS

## (undated) DEVICE — GLV SURG SENSICARE POLYISPRN W/ALOE PF LF 6.5 GRN STRL

## (undated) DEVICE — GLV SURG TRIUMPH LT PF LTX 6 STRL

## (undated) DEVICE — BLD SHAVER EXCALIBUR 4MM 13CM

## (undated) DEVICE — SUT ETHLN 3-0 FS118IN 663H

## (undated) DEVICE — DRSNG GZ CURAD XEROFORM NONADHS 5X9IN STRL

## (undated) DEVICE — PK KN ARTHSCP LF 60

## (undated) DEVICE — GOWN,AURORA,NOREINF,RAGLAN,XL,STERILE: Brand: MEDLINE

## (undated) DEVICE — DISPOSABLE TOURNIQUET CUFF SINGLE BLADDER, DUAL PORT AND QUICK CONNECT CONNECTOR: Brand: COLOR CUFF

## (undated) DEVICE — GLV SURG ORTHO BIOGEL OPTIFIT PF SZ9

## (undated) DEVICE — TBG PUMP ARTHSCP MAIN AR6400 16FT

## (undated) DEVICE — SPNG GZ WOVN 4X4IN 12PLY 10/BX STRL